# Patient Record
Sex: FEMALE | Race: WHITE | NOT HISPANIC OR LATINO | Employment: FULL TIME | URBAN - METROPOLITAN AREA
[De-identification: names, ages, dates, MRNs, and addresses within clinical notes are randomized per-mention and may not be internally consistent; named-entity substitution may affect disease eponyms.]

---

## 2017-02-27 ENCOUNTER — CONVERSION ENCOUNTER (OUTPATIENT)
Dept: MAMMOGRAPHY | Facility: HOSPITAL | Age: 57
End: 2017-02-27

## 2018-05-03 ENCOUNTER — CONVERSION ENCOUNTER (OUTPATIENT)
Dept: MAMMOGRAPHY | Facility: HOSPITAL | Age: 58
End: 2018-05-03

## 2019-05-26 ENCOUNTER — HOSPITAL ENCOUNTER (OUTPATIENT)
Dept: URGENT CARE | Facility: CLINIC | Age: 59
Discharge: HOME | End: 2019-05-26

## 2019-07-17 ENCOUNTER — HOSPITAL ENCOUNTER (OUTPATIENT)
Dept: OTHER | Facility: HOSPITAL | Age: 59
Discharge: HOME | End: 2019-07-17

## 2019-07-17 LAB
ALBUMIN SERPL-MCNC: 4.2 G/DL (ref 3.5–5)
ALBUMIN/GLOB SERPL: 1.6 {RATIO} (ref 1.4–2.6)
ALP SERPL-CCNC: 94 U/L (ref 53–141)
ALT SERPL-CCNC: 13 U/L (ref 10–40)
ANION GAP SERPL CALC-SCNC: 18 MMOL/L (ref 8–19)
APPEARANCE UR: CLEAR
ASO AB SERPL-ACNC: 46 [IU]/ML (ref 0–200)
AST SERPL-CCNC: 14 U/L (ref 15–50)
BASOPHILS # BLD AUTO: 0.03 10*3/UL (ref 0–0.2)
BASOPHILS NFR BLD AUTO: 0.4 % (ref 0–3)
BILIRUB SERPL-MCNC: 0.24 MG/DL (ref 0.2–1.3)
BILIRUB UR QL: NEGATIVE
BUN SERPL-MCNC: 16 MG/DL (ref 5–25)
BUN/CREAT SERPL: 22 {RATIO} (ref 6–20)
CALCIUM SERPL-MCNC: 8.9 MG/DL (ref 8.7–10.4)
CHLORIDE SERPL-SCNC: 104 MMOL/L (ref 99–111)
COLOR UR: YELLOW
CONV ABS IMM GRAN: 0.02 10*3/UL (ref 0–0.2)
CONV CO2: 25 MMOL/L (ref 22–32)
CONV COLLECTION SOURCE (UA): NORMAL
CONV IMMATURE GRAN: 0.3 % (ref 0–1.8)
CONV TOTAL PROTEIN: 6.8 G/DL (ref 6.3–8.2)
CONV UROBILINOGEN IN URINE BY AUTOMATED TEST STRIP: 1 {EHRLICHU}/DL (ref 0.1–1)
CREAT UR-MCNC: 0.74 MG/DL (ref 0.5–0.9)
DEPRECATED RDW RBC AUTO: 46.7 FL (ref 36.4–46.3)
EOSINOPHIL # BLD AUTO: 0.13 10*3/UL (ref 0–0.7)
EOSINOPHIL # BLD AUTO: 1.9 % (ref 0–7)
ERYTHROCYTE [DISTWIDTH] IN BLOOD BY AUTOMATED COUNT: 14.5 % (ref 11.7–14.4)
GFR SERPLBLD BASED ON 1.73 SQ M-ARVRAT: >60 ML/MIN/{1.73_M2}
GLOBULIN UR ELPH-MCNC: 2.6 G/DL (ref 2–3.5)
GLUCOSE SERPL-MCNC: 93 MG/DL (ref 65–99)
GLUCOSE UR QL: NEGATIVE MG/DL
HBA1C MFR BLD: 13.3 G/DL (ref 12–16)
HCT VFR BLD AUTO: 42.6 % (ref 37–47)
HGB UR QL STRIP: NEGATIVE
KETONES UR QL STRIP: NEGATIVE MG/DL
LEUKOCYTE ESTERASE UR QL STRIP: NEGATIVE
LYMPHOCYTES # BLD AUTO: 1.78 10*3/UL (ref 1–5)
MCH RBC QN AUTO: 27.8 PG (ref 27–31)
MCHC RBC AUTO-ENTMCNC: 31.2 G/DL (ref 33–37)
MCV RBC AUTO: 88.9 FL (ref 81–99)
MONOCYTES # BLD AUTO: 0.6 10*3/UL (ref 0.2–1.2)
MONOCYTES NFR BLD AUTO: 8.7 % (ref 3–10)
NEUTROPHILS # BLD AUTO: 4.34 10*3/UL (ref 2–8)
NEUTROPHILS NFR BLD AUTO: 62.9 % (ref 30–85)
NITRITE UR QL STRIP: NEGATIVE
NRBC CBCN: 0 % (ref 0–0.7)
OSMOLALITY SERPL CALC.SUM OF ELEC: 297 MOSM/KG (ref 273–304)
PH UR STRIP.AUTO: 6.5 [PH] (ref 5–8)
PLATELET # BLD AUTO: 245 10*3/UL (ref 130–400)
PMV BLD AUTO: 10.5 FL (ref 9.4–12.3)
POTASSIUM SERPL-SCNC: 4.4 MMOL/L (ref 3.5–5.3)
PROT UR QL: NEGATIVE MG/DL
RBC # BLD AUTO: 4.79 10*6/UL (ref 4.2–5.4)
SODIUM SERPL-SCNC: 143 MMOL/L (ref 135–147)
SP GR UR: 1.02 (ref 1–1.03)
VARIANT LYMPHS NFR BLD MANUAL: 25.8 % (ref 20–45)
WBC # BLD AUTO: 6.9 10*3/UL (ref 4.8–10.8)

## 2019-07-18 LAB
B BURGDOR IGG+IGM SER-ACNC: <0.91 ISR (ref 0–0.9)
CONV ANTI MICROSOMAL AB: 6 IU/ML (ref 0–34)
CONV HEPATITIS B SURFACE AG W CONFIRMATION RE: NEGATIVE
CONV THYROGLOBULIN ANTIBODY: <1 IU/ML (ref 0–0.9)
HCV AB SER DONR QL: <0.1 S/CO RATIO (ref 0–0.9)
THYROGLOB SERPL-MCNC: 10.9 NG/ML (ref 1.5–38.5)

## 2019-07-20 LAB
CONV EPSTEIN BARR VIRAL CAPSID IGM: <10 U/ML (ref 0–43.9)
CONV EPSTEIN BARR VIRUS ANTIBODY TO VIRAL CAPSID IGG: <10 U/ML (ref 0–21.9)

## 2020-03-15 ENCOUNTER — HOSPITAL ENCOUNTER (OUTPATIENT)
Dept: URGENT CARE | Facility: CLINIC | Age: 60
Discharge: HOME | End: 2020-03-15
Attending: PHYSICIAN ASSISTANT

## 2021-03-26 ENCOUNTER — HOSPITAL ENCOUNTER (OUTPATIENT)
Dept: MAMMOGRAPHY | Facility: HOSPITAL | Age: 61
Discharge: HOME | End: 2021-03-26
Attending: FAMILY MEDICINE

## 2023-09-07 ENCOUNTER — HOSPITAL ENCOUNTER (OUTPATIENT)
Dept: GENERAL RADIOLOGY | Facility: HOSPITAL | Age: 63
Discharge: HOME | End: 2023-09-07
Payer: COMMERCIAL

## 2023-09-07 ENCOUNTER — TRANSCRIBE ORDERS (OUTPATIENT)
Dept: ADMINISTRATIVE | Facility: HOSPITAL | Age: 63
End: 2023-09-07
Payer: COMMERCIAL

## 2023-09-07 DIAGNOSIS — M79.621 PAIN OF RIGHT UPPER ARM: ICD-10-CM

## 2023-09-07 DIAGNOSIS — M79.601 RIGHT UPPER LIMB PAIN: ICD-10-CM

## 2023-09-07 DIAGNOSIS — M79.601 RIGHT UPPER LIMB PAIN: Primary | ICD-10-CM

## 2023-10-26 ENCOUNTER — TRANSCRIBE ORDERS (OUTPATIENT)
Dept: ADMINISTRATIVE | Facility: HOSPITAL | Age: 63
End: 2023-10-26
Payer: COMMERCIAL

## 2023-10-26 DIAGNOSIS — Z12.31 SCREENING MAMMOGRAM FOR BREAST CANCER: Primary | ICD-10-CM

## 2023-11-13 ENCOUNTER — HOSPITAL ENCOUNTER (OUTPATIENT)
Dept: MAMMOGRAPHY | Facility: HOSPITAL | Age: 63
Discharge: HOME | End: 2023-11-13
Admitting: NURSE PRACTITIONER
Payer: COMMERCIAL

## 2023-11-13 DIAGNOSIS — Z12.31 SCREENING MAMMOGRAM FOR BREAST CANCER: ICD-10-CM

## 2023-11-13 PROCEDURE — 77067 SCR MAMMO BI INCL CAD: CPT

## 2023-11-13 PROCEDURE — 77063 BREAST TOMOSYNTHESIS BI: CPT

## 2023-11-18 ENCOUNTER — APPOINTMENT (OUTPATIENT)
Dept: GENERAL RADIOLOGY | Facility: HOSPITAL | Age: 63
End: 2023-11-18
Payer: OTHER MISCELLANEOUS

## 2023-11-18 ENCOUNTER — HOSPITAL ENCOUNTER (EMERGENCY)
Facility: HOSPITAL | Age: 63
Discharge: HOME | End: 2023-11-18
Attending: EMERGENCY MEDICINE
Payer: OTHER MISCELLANEOUS

## 2023-11-18 ENCOUNTER — APPOINTMENT (OUTPATIENT)
Dept: CT IMAGING | Facility: HOSPITAL | Age: 63
End: 2023-11-18
Payer: OTHER MISCELLANEOUS

## 2023-11-18 VITALS
SYSTOLIC BLOOD PRESSURE: 129 MMHG | HEART RATE: 78 BPM | RESPIRATION RATE: 20 BRPM | BODY MASS INDEX: 20.97 KG/M2 | DIASTOLIC BLOOD PRESSURE: 64 MMHG | OXYGEN SATURATION: 95 % | TEMPERATURE: 98.6 F | WEIGHT: 113.98 LBS | HEIGHT: 62 IN

## 2023-11-18 DIAGNOSIS — S16.1XXA STRAIN OF NECK MUSCLE, INITIAL ENCOUNTER: ICD-10-CM

## 2023-11-18 DIAGNOSIS — S00.93XA CONTUSION OF HEAD, UNSPECIFIED PART OF HEAD, INITIAL ENCOUNTER: Primary | ICD-10-CM

## 2023-11-18 DIAGNOSIS — S30.0XXA CONTUSION OF LOWER BACK AND PELVIS, INITIAL ENCOUNTER: ICD-10-CM

## 2023-11-18 PROCEDURE — 72170 X-RAY EXAM OF PELVIS: CPT

## 2023-11-18 PROCEDURE — 25010000002 KETOROLAC TROMETHAMINE PER 15 MG

## 2023-11-18 PROCEDURE — 99284 EMERGENCY DEPT VISIT MOD MDM: CPT

## 2023-11-18 PROCEDURE — 70450 CT HEAD/BRAIN W/O DYE: CPT

## 2023-11-18 PROCEDURE — 72050 X-RAY EXAM NECK SPINE 4/5VWS: CPT

## 2023-11-18 PROCEDURE — 96372 THER/PROPH/DIAG INJ SC/IM: CPT

## 2023-11-18 RX ORDER — DICLOFENAC SODIUM 75 MG/1
75 TABLET, DELAYED RELEASE ORAL 2 TIMES DAILY PRN
Qty: 20 TABLET | Refills: 0 | Status: SHIPPED | OUTPATIENT
Start: 2023-11-18 | End: 2023-11-28

## 2023-11-18 RX ORDER — KETOROLAC TROMETHAMINE 30 MG/ML
30 INJECTION, SOLUTION INTRAMUSCULAR; INTRAVENOUS ONCE
Status: COMPLETED | OUTPATIENT
Start: 2023-11-18 | End: 2023-11-18

## 2023-11-18 RX ADMIN — KETOROLAC TROMETHAMINE 30 MG: 30 INJECTION, SOLUTION INTRAMUSCULAR; INTRAVENOUS at 22:59

## 2024-08-08 ENCOUNTER — OFFICE VISIT (OUTPATIENT)
Dept: ORTHOPEDIC SURGERY | Facility: CLINIC | Age: 64
End: 2024-08-08
Payer: OTHER MISCELLANEOUS

## 2024-08-08 VITALS — SYSTOLIC BLOOD PRESSURE: 109 MMHG | DIASTOLIC BLOOD PRESSURE: 57 MMHG | HEART RATE: 54 BPM | OXYGEN SATURATION: 90 %

## 2024-08-08 DIAGNOSIS — S49.91XA INJURY OF RIGHT SHOULDER, INITIAL ENCOUNTER: Primary | ICD-10-CM

## 2024-08-08 RX ORDER — ATORVASTATIN CALCIUM 40 MG/1
40 TABLET, FILM COATED ORAL NIGHTLY
COMMUNITY
Start: 2023-10-20

## 2024-08-08 RX ORDER — CYCLOBENZAPRINE HCL 5 MG
1 TABLET ORAL 2 TIMES DAILY PRN
COMMUNITY

## 2024-08-08 RX ORDER — MELOXICAM 15 MG/1
TABLET ORAL
COMMUNITY
Start: 2024-07-16

## 2024-08-22 ENCOUNTER — HOSPITAL ENCOUNTER (OUTPATIENT)
Dept: MRI IMAGING | Facility: HOSPITAL | Age: 64
Discharge: HOME | End: 2024-08-22
Admitting: ORTHOPAEDIC SURGERY
Payer: OTHER MISCELLANEOUS

## 2024-08-22 DIAGNOSIS — S49.91XA INJURY OF RIGHT SHOULDER, INITIAL ENCOUNTER: ICD-10-CM

## 2024-08-22 PROCEDURE — 73221 MRI JOINT UPR EXTREM W/O DYE: CPT

## 2024-08-27 ENCOUNTER — PREP FOR SURGERY (OUTPATIENT)
Dept: OTHER | Facility: HOSPITAL | Age: 64
End: 2024-08-27
Payer: COMMERCIAL

## 2024-08-27 ENCOUNTER — OFFICE VISIT (OUTPATIENT)
Dept: ORTHOPEDIC SURGERY | Facility: CLINIC | Age: 64
End: 2024-08-27
Payer: COMMERCIAL

## 2024-08-27 VITALS
BODY MASS INDEX: 20.8 KG/M2 | WEIGHT: 113 LBS | HEART RATE: 61 BPM | OXYGEN SATURATION: 91 % | DIASTOLIC BLOOD PRESSURE: 62 MMHG | HEIGHT: 62 IN | SYSTOLIC BLOOD PRESSURE: 114 MMHG

## 2024-08-27 DIAGNOSIS — S46.011A TRAUMATIC COMPLETE TEAR OF RIGHT ROTATOR CUFF, INITIAL ENCOUNTER: Primary | ICD-10-CM

## 2024-09-05 ENCOUNTER — PATIENT ROUNDING (BHMG ONLY) (OUTPATIENT)
Dept: CARDIOLOGY | Facility: CLINIC | Age: 64
End: 2024-09-05
Payer: COMMERCIAL

## 2024-09-05 ENCOUNTER — OFFICE VISIT (OUTPATIENT)
Dept: CARDIOLOGY | Facility: CLINIC | Age: 64
End: 2024-09-05
Payer: COMMERCIAL

## 2024-09-05 VITALS
WEIGHT: 117 LBS | DIASTOLIC BLOOD PRESSURE: 64 MMHG | SYSTOLIC BLOOD PRESSURE: 133 MMHG | HEART RATE: 59 BPM | HEIGHT: 62 IN | BODY MASS INDEX: 21.53 KG/M2

## 2024-09-05 DIAGNOSIS — R06.02 SOB (SHORTNESS OF BREATH): ICD-10-CM

## 2024-09-05 DIAGNOSIS — R07.2 PRECORDIAL PAIN: Primary | ICD-10-CM

## 2024-09-05 DIAGNOSIS — R00.2 PALPITATIONS: ICD-10-CM

## 2024-09-05 PROCEDURE — 99203 OFFICE O/P NEW LOW 30 MIN: CPT | Performed by: INTERNAL MEDICINE

## 2024-09-05 PROCEDURE — 93000 ELECTROCARDIOGRAM COMPLETE: CPT | Performed by: INTERNAL MEDICINE

## 2024-09-06 ENCOUNTER — HOSPITAL ENCOUNTER (OUTPATIENT)
Dept: CARDIOLOGY | Facility: HOSPITAL | Age: 64
Discharge: HOME | End: 2024-09-06
Payer: COMMERCIAL

## 2024-09-06 PROCEDURE — 93017 CV STRESS TEST TRACING ONLY: CPT

## 2024-09-06 PROCEDURE — 25010000002 REGADENOSON 0.4 MG/5ML SOLUTION: Performed by: INTERNAL MEDICINE

## 2024-09-06 PROCEDURE — 78452 HT MUSCLE IMAGE SPECT MULT: CPT

## 2024-09-06 PROCEDURE — 0 TECHNETIUM SESTAMIBI: Performed by: INTERNAL MEDICINE

## 2024-09-06 PROCEDURE — A9500 TC99M SESTAMIBI: HCPCS | Performed by: INTERNAL MEDICINE

## 2024-09-06 RX ORDER — REGADENOSON 0.08 MG/ML
0.4 INJECTION, SOLUTION INTRAVENOUS
Status: COMPLETED | OUTPATIENT
Start: 2024-09-06 | End: 2024-09-06

## 2024-09-06 RX ADMIN — TECHNETIUM TC 99M SESTAMIBI 1 DOSE: 1 INJECTION INTRAVENOUS at 07:20

## 2024-09-06 RX ADMIN — REGADENOSON 0.4 MG: 0.08 INJECTION, SOLUTION INTRAVENOUS at 08:48

## 2024-09-06 RX ADMIN — TECHNETIUM TC 99M SESTAMIBI 1 DOSE: 1 INJECTION INTRAVENOUS at 08:48

## 2024-09-09 LAB
BH CV REST NUCLEAR ISOTOPE DOSE: 10.9 MCI
BH CV STRESS COMMENTS STAGE 1: NORMAL
BH CV STRESS DOSE REGADENOSON STAGE 1: 0.4
BH CV STRESS DURATION MIN STAGE 1: 0
BH CV STRESS DURATION SEC STAGE 1: 10
BH CV STRESS NUCLEAR ISOTOPE DOSE: 32.9 MCI
BH CV STRESS PROTOCOL 1: NORMAL
BH CV STRESS RECOVERY BP: NORMAL MMHG
BH CV STRESS RECOVERY HR: 77 BPM
BH CV STRESS STAGE 1: 1
LV EF NUC BP: 50 %
MAXIMAL PREDICTED HEART RATE: 155 BPM
PERCENT MAX PREDICTED HR: 65.16 %
STRESS BASELINE BP: NORMAL MMHG
STRESS BASELINE HR: 53 BPM
STRESS PERCENT HR: 77 %
STRESS POST PEAK BP: NORMAL MMHG
STRESS POST PEAK HR: 101 BPM
STRESS TARGET HR: 132 BPM

## 2024-09-11 ENCOUNTER — HOSPITAL ENCOUNTER (OUTPATIENT)
Dept: CARDIOLOGY | Facility: HOSPITAL | Age: 64
Discharge: HOME | End: 2024-09-11
Admitting: INTERNAL MEDICINE
Payer: COMMERCIAL

## 2024-09-11 VITALS
BODY MASS INDEX: 21.54 KG/M2 | SYSTOLIC BLOOD PRESSURE: 132 MMHG | OXYGEN SATURATION: 94 % | HEIGHT: 62 IN | DIASTOLIC BLOOD PRESSURE: 68 MMHG | WEIGHT: 117.06 LBS | HEART RATE: 53 BPM

## 2024-09-11 LAB
AORTIC DIMENSIONLESS INDEX: 0.6 (DI)
ASCENDING AORTA: 2.9 CM
BH CV ECHO MEAS - ACS: 1.71 CM
BH CV ECHO MEAS - AO MAX PG: 6.7 MMHG
BH CV ECHO MEAS - AO MEAN PG: 2.8 MMHG
BH CV ECHO MEAS - AO V2 MAX: 129 CM/SEC
BH CV ECHO MEAS - AO V2 VTI: 27.5 CM
BH CV ECHO MEAS - AVA(I,D): 1.96 CM2
BH CV ECHO MEAS - EDV(CUBED): 104 ML
BH CV ECHO MEAS - EDV(MOD-SP2): 50 ML
BH CV ECHO MEAS - EDV(MOD-SP4): 45 ML
BH CV ECHO MEAS - EF(MOD-BP): 61.2 %
BH CV ECHO MEAS - EF(MOD-SP2): 64 %
BH CV ECHO MEAS - EF(MOD-SP4): 62.2 %
BH CV ECHO MEAS - ESV(CUBED): 39.5 ML
BH CV ECHO MEAS - ESV(MOD-SP2): 18 ML
BH CV ECHO MEAS - ESV(MOD-SP4): 17 ML
BH CV ECHO MEAS - FS: 27.6 %
BH CV ECHO MEAS - IVS/LVPW: 0.87 CM
BH CV ECHO MEAS - IVSD: 0.63 CM
BH CV ECHO MEAS - LA A2CS (ATRIAL LENGTH): 4.8 CM
BH CV ECHO MEAS - LA A4C LENGTH: 4.2 CM
BH CV ECHO MEAS - LAT PEAK E' VEL: 9.5 CM/SEC
BH CV ECHO MEAS - LV DIASTOLIC VOL/BSA (35-75): 29.6 CM2
BH CV ECHO MEAS - LV MASS(C)D: 99.5 GRAMS
BH CV ECHO MEAS - LV MAX PG: 3 MMHG
BH CV ECHO MEAS - LV MEAN PG: 1.36 MMHG
BH CV ECHO MEAS - LV SYSTOLIC VOL/BSA (12-30): 11.2 CM2
BH CV ECHO MEAS - LV V1 MAX: 86.1 CM/SEC
BH CV ECHO MEAS - LV V1 VTI: 17.9 CM
BH CV ECHO MEAS - LVIDD: 4.7 CM
BH CV ECHO MEAS - LVIDS: 3.4 CM
BH CV ECHO MEAS - LVOT AREA: 3 CM2
BH CV ECHO MEAS - LVOT DIAM: 1.96 CM
BH CV ECHO MEAS - LVPWD: 0.73 CM
BH CV ECHO MEAS - MED PEAK E' VEL: 8.9 CM/SEC
BH CV ECHO MEAS - MV A DUR: 0.12 SEC
BH CV ECHO MEAS - MV A MAX VEL: 51.4 CM/SEC
BH CV ECHO MEAS - MV DEC SLOPE: 498 CM/SEC2
BH CV ECHO MEAS - MV DEC TIME: 220 SEC
BH CV ECHO MEAS - MV E MAX VEL: 45.4 CM/SEC
BH CV ECHO MEAS - MV E/A: 0.88
BH CV ECHO MEAS - MV MAX PG: 2.47 MMHG
BH CV ECHO MEAS - MV MEAN PG: 0.69 MMHG
BH CV ECHO MEAS - MV P1/2T: 48.5 MSEC
BH CV ECHO MEAS - MV V2 VTI: 24.6 CM
BH CV ECHO MEAS - MVA(P1/2T): 4.5 CM2
BH CV ECHO MEAS - MVA(VTI): 2.19 CM2
BH CV ECHO MEAS - PA ACC TIME: 0.13 SEC
BH CV ECHO MEAS - PA V2 MAX: 58.2 CM/SEC
BH CV ECHO MEAS - PULM A REVS DUR: 0.1 SEC
BH CV ECHO MEAS - PULM A REVS VEL: 24.4 CM/SEC
BH CV ECHO MEAS - PULM DIAS VEL: 28.3 CM/SEC
BH CV ECHO MEAS - PULM S/D: 1.44
BH CV ECHO MEAS - PULM SYS VEL: 40.7 CM/SEC
BH CV ECHO MEAS - QP/QS: 0.34
BH CV ECHO MEAS - RAP SYSTOLE: 3 MMHG
BH CV ECHO MEAS - RV MAX PG: 0.49 MMHG
BH CV ECHO MEAS - RV V1 MAX: 35.1 CM/SEC
BH CV ECHO MEAS - RV V1 VTI: 6 CM
BH CV ECHO MEAS - RVOT DIAM: 1.99 CM
BH CV ECHO MEAS - RVSP: 16 MMHG
BH CV ECHO MEAS - SV(LVOT): 53.8 ML
BH CV ECHO MEAS - SV(MOD-SP2): 32 ML
BH CV ECHO MEAS - SV(MOD-SP4): 28 ML
BH CV ECHO MEAS - SV(RVOT): 18.5 ML
BH CV ECHO MEAS - SVI(LVOT): 35.4 ML/M2
BH CV ECHO MEAS - SVI(MOD-SP2): 21 ML/M2
BH CV ECHO MEAS - SVI(MOD-SP4): 18.4 ML/M2
BH CV ECHO MEAS - TAPSE (>1.6): 1.47 CM
BH CV ECHO MEAS - TR MAX PG: 13.2 MMHG
BH CV ECHO MEAS - TR MAX VEL: 181.3 CM/SEC
BH CV ECHO MEASUREMENTS AVERAGE E/E' RATIO: 4.93
BH CV XLRA - RV BASE: 2.33 CM
BH CV XLRA - RV LENGTH: 5.5 CM
BH CV XLRA - RV MID: 2.19 CM
BH CV XLRA - TDI S': 13.2 CM/SEC
LEFT ATRIUM VOLUME INDEX: 30.4 ML/M2
SINUS: 2.9 CM
STJ: 2.25 CM

## 2024-09-11 PROCEDURE — 93306 TTE W/DOPPLER COMPLETE: CPT | Performed by: INTERNAL MEDICINE

## 2024-09-11 PROCEDURE — 93306 TTE W/DOPPLER COMPLETE: CPT

## 2024-09-19 ENCOUNTER — OFFICE VISIT (OUTPATIENT)
Dept: CARDIOLOGY | Facility: CLINIC | Age: 64
End: 2024-09-19
Payer: COMMERCIAL

## 2024-09-19 VITALS
BODY MASS INDEX: 22.45 KG/M2 | SYSTOLIC BLOOD PRESSURE: 122 MMHG | DIASTOLIC BLOOD PRESSURE: 72 MMHG | WEIGHT: 122 LBS | HEART RATE: 59 BPM | HEIGHT: 62 IN

## 2024-09-19 DIAGNOSIS — R00.2 PALPITATIONS: ICD-10-CM

## 2024-09-19 DIAGNOSIS — R94.39 ABNORMAL NUCLEAR STRESS TEST: ICD-10-CM

## 2024-09-19 DIAGNOSIS — R06.02 SOB (SHORTNESS OF BREATH): ICD-10-CM

## 2024-09-19 DIAGNOSIS — R07.2 PRECORDIAL PAIN: Primary | ICD-10-CM

## 2024-09-19 PROCEDURE — 99214 OFFICE O/P EST MOD 30 MIN: CPT | Performed by: INTERNAL MEDICINE

## 2024-09-20 ENCOUNTER — TELEPHONE (OUTPATIENT)
Dept: ORTHOPEDIC SURGERY | Facility: CLINIC | Age: 64
End: 2024-09-20
Payer: COMMERCIAL

## 2024-09-20 PROBLEM — R07.2 PRECORDIAL PAIN: Status: ACTIVE | Noted: 2024-09-19

## 2024-09-20 PROBLEM — R94.39 ABNORMAL NUCLEAR STRESS TEST: Status: ACTIVE | Noted: 2024-09-19

## 2024-09-20 NOTE — PAT
"MESSAGE SENT TO [facility] THAT HAD SPOKE WITH DAUGHTER PAT AND THAT SHE REPORTED HER MOTHER HAD SEEN CARDIOLOGY ON 9/19/24 AND THAT HE WOULD NOT GIVE HER CARDIAC CLEARANCE BECAUSE OF \"BLOCKAGES SHE NEEDS TO GET TAKEN CARE OF FIRST\". INSTRUCTED DAUGHTER THEY WOULD ALSO NEED TO CALL [facility] AND LET THEM KNOW.   " none

## 2024-09-23 ENCOUNTER — LAB (OUTPATIENT)
Dept: LAB | Facility: HOSPITAL | Age: 64
End: 2024-09-23
Payer: COMMERCIAL

## 2024-09-23 DIAGNOSIS — S46.011A TRAUMATIC COMPLETE TEAR OF RIGHT ROTATOR CUFF, INITIAL ENCOUNTER: ICD-10-CM

## 2024-09-23 DIAGNOSIS — R07.2 PRECORDIAL PAIN: ICD-10-CM

## 2024-09-23 DIAGNOSIS — R94.39 ABNORMAL NUCLEAR STRESS TEST: ICD-10-CM

## 2024-09-23 LAB
ANION GAP SERPL CALCULATED.3IONS-SCNC: 8.7 MMOL/L (ref 5–15)
APTT PPP: 26.7 SECONDS (ref 24.2–34.2)
BACTERIA UR QL AUTO: ABNORMAL /HPF
BASOPHILS # BLD AUTO: 0.03 10*3/MM3 (ref 0–0.2)
BASOPHILS NFR BLD AUTO: 0.5 % (ref 0–1.5)
BILIRUB UR QL STRIP: NEGATIVE
BUN SERPL-MCNC: 20 MG/DL (ref 8–23)
BUN/CREAT SERPL: 21.1 (ref 7–25)
CALCIUM SPEC-SCNC: 9.3 MG/DL (ref 8.6–10.5)
CHLORIDE SERPL-SCNC: 108 MMOL/L (ref 98–107)
CLARITY UR: CLEAR
CO2 SERPL-SCNC: 28.3 MMOL/L (ref 22–29)
COLOR UR: YELLOW
CREAT SERPL-MCNC: 0.95 MG/DL (ref 0.57–1)
DEPRECATED RDW RBC AUTO: 47.8 FL (ref 37–54)
EGFRCR SERPLBLD CKD-EPI 2021: 66.6 ML/MIN/1.73
EOSINOPHIL # BLD AUTO: 0.16 10*3/MM3 (ref 0–0.4)
EOSINOPHIL NFR BLD AUTO: 2.6 % (ref 0.3–6.2)
ERYTHROCYTE [DISTWIDTH] IN BLOOD BY AUTOMATED COUNT: 14.1 % (ref 12.3–15.4)
GLUCOSE SERPL-MCNC: 86 MG/DL (ref 65–99)
GLUCOSE UR STRIP-MCNC: NEGATIVE MG/DL
HCT VFR BLD AUTO: 38.2 % (ref 34–46.6)
HGB BLD-MCNC: 12.1 G/DL (ref 12–15.9)
HGB UR QL STRIP.AUTO: NEGATIVE
HOLD SPECIMEN: NORMAL
HYALINE CASTS UR QL AUTO: ABNORMAL /LPF
IMM GRANULOCYTES # BLD AUTO: 0.02 10*3/MM3 (ref 0–0.05)
IMM GRANULOCYTES NFR BLD AUTO: 0.3 % (ref 0–0.5)
INR PPP: 0.99 (ref 0.86–1.15)
KETONES UR QL STRIP: NEGATIVE
LEUKOCYTE ESTERASE UR QL STRIP.AUTO: ABNORMAL
LYMPHOCYTES # BLD AUTO: 1.29 10*3/MM3 (ref 0.7–3.1)
LYMPHOCYTES NFR BLD AUTO: 20.6 % (ref 19.6–45.3)
MCH RBC QN AUTO: 28.9 PG (ref 26.6–33)
MCHC RBC AUTO-ENTMCNC: 31.7 G/DL (ref 31.5–35.7)
MCV RBC AUTO: 91.4 FL (ref 79–97)
MONOCYTES # BLD AUTO: 0.58 10*3/MM3 (ref 0.1–0.9)
MONOCYTES NFR BLD AUTO: 9.3 % (ref 5–12)
NEUTROPHILS NFR BLD AUTO: 4.18 10*3/MM3 (ref 1.7–7)
NEUTROPHILS NFR BLD AUTO: 66.7 % (ref 42.7–76)
NITRITE UR QL STRIP: NEGATIVE
NRBC BLD AUTO-RTO: 0 /100 WBC (ref 0–0.2)
PH UR STRIP.AUTO: 6 [PH] (ref 5–8)
PLATELET # BLD AUTO: 236 10*3/MM3 (ref 140–450)
PMV BLD AUTO: 11.5 FL (ref 6–12)
POTASSIUM SERPL-SCNC: 4.3 MMOL/L (ref 3.5–5.2)
PROT UR QL STRIP: ABNORMAL
PROTHROMBIN TIME: 13.3 SECONDS (ref 11.8–14.9)
RBC # BLD AUTO: 4.18 10*6/MM3 (ref 3.77–5.28)
RBC # UR STRIP: ABNORMAL /HPF
REF LAB TEST METHOD: ABNORMAL
SODIUM SERPL-SCNC: 145 MMOL/L (ref 136–145)
SP GR UR STRIP: 1.02 (ref 1–1.03)
SQUAMOUS #/AREA URNS HPF: ABNORMAL /HPF
UROBILINOGEN UR QL STRIP: ABNORMAL
WBC # UR STRIP: ABNORMAL /HPF
WBC NRBC COR # BLD AUTO: 6.26 10*3/MM3 (ref 3.4–10.8)

## 2024-09-23 PROCEDURE — 81001 URINALYSIS AUTO W/SCOPE: CPT

## 2024-09-23 PROCEDURE — 80048 BASIC METABOLIC PNL TOTAL CA: CPT

## 2024-09-23 PROCEDURE — 85730 THROMBOPLASTIN TIME PARTIAL: CPT

## 2024-09-23 PROCEDURE — 85025 COMPLETE CBC W/AUTO DIFF WBC: CPT

## 2024-09-23 PROCEDURE — 85610 PROTHROMBIN TIME: CPT

## 2024-09-27 ENCOUNTER — HOSPITAL ENCOUNTER (OUTPATIENT)
Facility: HOSPITAL | Age: 64
Setting detail: HOSPITAL OUTPATIENT SURGERY
Discharge: HOME | End: 2024-09-27
Attending: INTERNAL MEDICINE | Admitting: INTERNAL MEDICINE
Payer: COMMERCIAL

## 2024-09-27 ENCOUNTER — TELEPHONE (OUTPATIENT)
Dept: ORTHOPEDIC SURGERY | Facility: CLINIC | Age: 64
End: 2024-09-27
Payer: COMMERCIAL

## 2024-09-27 VITALS
BODY MASS INDEX: 22.63 KG/M2 | RESPIRATION RATE: 20 BRPM | SYSTOLIC BLOOD PRESSURE: 138 MMHG | HEART RATE: 58 BPM | WEIGHT: 123 LBS | TEMPERATURE: 97 F | HEIGHT: 62 IN | OXYGEN SATURATION: 94 % | DIASTOLIC BLOOD PRESSURE: 70 MMHG

## 2024-09-27 DIAGNOSIS — R07.2 PRECORDIAL PAIN: ICD-10-CM

## 2024-09-27 DIAGNOSIS — R94.39 ABNORMAL NUCLEAR STRESS TEST: ICD-10-CM

## 2024-09-27 PROBLEM — R00.2 PALPITATIONS: Status: ACTIVE | Noted: 2024-09-27

## 2024-09-27 PROBLEM — R06.02 SOB (SHORTNESS OF BREATH): Status: ACTIVE | Noted: 2024-09-27

## 2024-09-27 PROCEDURE — 93458 L HRT ARTERY/VENTRICLE ANGIO: CPT | Performed by: INTERNAL MEDICINE

## 2024-09-27 PROCEDURE — 25010000002 FENTANYL CITRATE (PF) 50 MCG/ML SOLUTION: Performed by: INTERNAL MEDICINE

## 2024-09-27 PROCEDURE — 25010000002 ONDANSETRON PER 1 MG: Performed by: INTERNAL MEDICINE

## 2024-09-27 PROCEDURE — 25010000002 HEPARIN (PORCINE) PER 1000 UNITS: Performed by: INTERNAL MEDICINE

## 2024-09-27 PROCEDURE — C1769 GUIDE WIRE: HCPCS | Performed by: INTERNAL MEDICINE

## 2024-09-27 PROCEDURE — 25510000001 IOPAMIDOL PER 1 ML: Performed by: INTERNAL MEDICINE

## 2024-09-27 PROCEDURE — 25010000002 MIDAZOLAM PER 1 MG: Performed by: INTERNAL MEDICINE

## 2024-09-27 PROCEDURE — 25810000003 SODIUM CHLORIDE 0.9 % SOLUTION: Performed by: INTERNAL MEDICINE

## 2024-09-27 PROCEDURE — C1894 INTRO/SHEATH, NON-LASER: HCPCS | Performed by: INTERNAL MEDICINE

## 2024-09-27 RX ORDER — ACETAMINOPHEN 325 MG/1
650 TABLET ORAL EVERY 4 HOURS PRN
Status: DISCONTINUED | OUTPATIENT
Start: 2024-09-27 | End: 2024-09-27 | Stop reason: HOSPADM

## 2024-09-27 RX ORDER — IOPAMIDOL 755 MG/ML
INJECTION, SOLUTION INTRAVASCULAR
Status: DISCONTINUED | OUTPATIENT
Start: 2024-09-27 | End: 2024-09-27 | Stop reason: HOSPADM

## 2024-09-27 RX ORDER — HEPARIN SODIUM 1000 [USP'U]/ML
INJECTION, SOLUTION INTRAVENOUS; SUBCUTANEOUS
Status: DISCONTINUED | OUTPATIENT
Start: 2024-09-27 | End: 2024-09-27 | Stop reason: HOSPADM

## 2024-09-27 RX ORDER — SODIUM CHLORIDE 0.9 % (FLUSH) 0.9 %
10 SYRINGE (ML) INJECTION AS NEEDED
Status: DISCONTINUED | OUTPATIENT
Start: 2024-09-27 | End: 2024-09-27 | Stop reason: HOSPADM

## 2024-09-27 RX ORDER — HYDROCODONE BITARTRATE AND ACETAMINOPHEN 5; 325 MG/1; MG/1
1 TABLET ORAL EVERY 6 HOURS PRN
Status: DISCONTINUED | OUTPATIENT
Start: 2024-09-27 | End: 2024-09-27 | Stop reason: HOSPADM

## 2024-09-27 RX ORDER — SODIUM CHLORIDE 9 MG/ML
40 INJECTION, SOLUTION INTRAVENOUS AS NEEDED
Status: DISCONTINUED | OUTPATIENT
Start: 2024-09-27 | End: 2024-09-27 | Stop reason: HOSPADM

## 2024-09-27 RX ORDER — SODIUM CHLORIDE 0.9 % (FLUSH) 0.9 %
10 SYRINGE (ML) INJECTION EVERY 12 HOURS SCHEDULED
Status: DISCONTINUED | OUTPATIENT
Start: 2024-09-27 | End: 2024-09-27 | Stop reason: HOSPADM

## 2024-09-27 RX ORDER — NITROGLYCERIN 0.4 MG/1
0.4 TABLET SUBLINGUAL
Status: DISCONTINUED | OUTPATIENT
Start: 2024-09-27 | End: 2024-09-27 | Stop reason: HOSPADM

## 2024-09-27 RX ORDER — MIDAZOLAM HYDROCHLORIDE 1 MG/ML
INJECTION INTRAMUSCULAR; INTRAVENOUS
Status: DISCONTINUED | OUTPATIENT
Start: 2024-09-27 | End: 2024-09-27 | Stop reason: HOSPADM

## 2024-09-27 RX ORDER — SODIUM CHLORIDE 9 MG/ML
75 INJECTION, SOLUTION INTRAVENOUS CONTINUOUS
Status: DISCONTINUED | OUTPATIENT
Start: 2024-09-27 | End: 2024-09-27 | Stop reason: HOSPADM

## 2024-09-27 RX ORDER — VERAPAMIL HYDROCHLORIDE 2.5 MG/ML
INJECTION, SOLUTION INTRAVENOUS
Status: DISCONTINUED | OUTPATIENT
Start: 2024-09-27 | End: 2024-09-27 | Stop reason: HOSPADM

## 2024-09-27 RX ORDER — FENTANYL CITRATE 50 UG/ML
INJECTION, SOLUTION INTRAMUSCULAR; INTRAVENOUS
Status: DISCONTINUED | OUTPATIENT
Start: 2024-09-27 | End: 2024-09-27 | Stop reason: HOSPADM

## 2024-09-27 RX ORDER — LIDOCAINE HYDROCHLORIDE 20 MG/ML
INJECTION, SOLUTION INFILTRATION; PERINEURAL
Status: DISCONTINUED | OUTPATIENT
Start: 2024-09-27 | End: 2024-09-27 | Stop reason: HOSPADM

## 2024-09-27 RX ORDER — ONDANSETRON 2 MG/ML
4 INJECTION INTRAMUSCULAR; INTRAVENOUS EVERY 6 HOURS PRN
Status: DISCONTINUED | OUTPATIENT
Start: 2024-09-27 | End: 2024-09-27 | Stop reason: HOSPADM

## 2024-09-27 RX ADMIN — ONDANSETRON 4 MG: 2 INJECTION, SOLUTION INTRAMUSCULAR; INTRAVENOUS at 11:12

## 2024-09-27 RX ADMIN — SODIUM CHLORIDE 75 ML/HR: 9 INJECTION, SOLUTION INTRAVENOUS at 09:06

## 2024-10-02 ENCOUNTER — TELEPHONE (OUTPATIENT)
Dept: CARDIOLOGY | Facility: CLINIC | Age: 64
End: 2024-10-02

## 2024-10-02 NOTE — TELEPHONE ENCOUNTER
Caller: PAT SIMMONS    Relationship: Emergency Contact    Best call back number: [phone]    What is the best time to reach you: ANYTIME    Who are you requesting to speak with (clinical staff, provider,  specific staff member): CLINICAL        What was the call regarding: DID YOU RECEIVED CARDIAC CLEARANCE FROM   AT ORTHOPEDIC OFFICE?  PLEASE CONTACT PAT IF YOU HAVE NOT RECEIVED.

## 2024-10-11 ENCOUNTER — OFFICE VISIT (OUTPATIENT)
Dept: CARDIOLOGY | Facility: CLINIC | Age: 64
End: 2024-10-11
Payer: COMMERCIAL

## 2024-10-11 VITALS
SYSTOLIC BLOOD PRESSURE: 153 MMHG | BODY MASS INDEX: 22.26 KG/M2 | WEIGHT: 121 LBS | HEART RATE: 57 BPM | DIASTOLIC BLOOD PRESSURE: 80 MMHG | HEIGHT: 62 IN

## 2024-10-11 DIAGNOSIS — I10 PRIMARY HYPERTENSION: ICD-10-CM

## 2024-10-11 DIAGNOSIS — R00.2 PALPITATIONS: ICD-10-CM

## 2024-10-11 DIAGNOSIS — Z09 HOSPITAL DISCHARGE FOLLOW-UP: Primary | ICD-10-CM

## 2024-10-11 RX ORDER — UMECLIDINIUM BROMIDE AND VILANTEROL TRIFENATATE 62.5; 25 UG/1; UG/1
POWDER RESPIRATORY (INHALATION)
COMMUNITY
Start: 2024-10-08

## 2024-10-11 RX ORDER — HYDROCHLOROTHIAZIDE 25 MG/1
12.5 TABLET ORAL DAILY PRN
COMMUNITY
Start: 2024-09-30

## 2024-10-11 RX ORDER — ALBUTEROL SULFATE 90 UG/1
INHALANT RESPIRATORY (INHALATION)
COMMUNITY
Start: 2024-10-08

## 2024-10-11 RX ORDER — GABAPENTIN 800 MG/1
TABLET ORAL
COMMUNITY
Start: 2024-10-04

## 2024-10-11 RX ORDER — PROPRANOLOL HCL 20 MG
1 TABLET ORAL DAILY
COMMUNITY
Start: 2024-09-10

## 2024-10-11 RX ORDER — HYDROCODONE BITARTRATE AND ACETAMINOPHEN 7.5; 325 MG/1; MG/1
1 TABLET ORAL EVERY 6 HOURS PRN
COMMUNITY
Start: 2024-10-05

## 2024-10-31 ENCOUNTER — TELEPHONE (OUTPATIENT)
Dept: ORTHOPEDIC SURGERY | Facility: CLINIC | Age: 64
End: 2024-10-31
Payer: COMMERCIAL

## 2024-10-31 NOTE — TELEPHONE ENCOUNTER
Provider: DR. DURAND    Caller: DENICE ESPOSITO    Relationship to Patient: SELF    Phone Number: [phone]    Reason for Call: PATIENT CALLED STATING SHE NEEDS A LETTER STATING SURGERY HAS BEEN MOVED FROM 11/18/24 TO 12/02/24. EMPLOYER IS REQUESTING THIS WITHIN THE NEXT COUPLE DAYS. WOULD LIKE TO GET THIS PICKED UP AS SOON AS POSSIBLE. PLEASE ADVISE.

## 2024-10-31 NOTE — LETTER
November 1, 2024     Shannon PEREZ Caswell  [address]  [place] [address]    Patient: Shannon Esposito   YOB: 1960   Date of Visit: 10/31/2024     To whom it may concern:    I am writing on behalf of my patient Shannon Esposito.  The patient's surgery has been rescheduled from November 18, 2024 to December 2, 2024.  If any questions or concerns please contact our office.       Sincerely,        Buddy Crook MD          Progress Notes:  Provider: DR. CROOK    Caller: SHANNON ESPOSITO    Relationship to Patient: SELF    Phone Number: [phone]    Reason for Call: PATIENT CALLED STATING SHE NEEDS A LETTER STATING SURGERY HAS BEEN MOVED FROM 11/18/24 TO 12/02/24. EMPLOYER IS REQUESTING THIS WITHIN THE NEXT COUPLE DAYS. WOULD LIKE TO GET THIS PICKED UP AS SOON AS POSSIBLE. PLEASE ADVISE.

## 2024-11-07 ENCOUNTER — OFFICE VISIT (OUTPATIENT)
Dept: ORTHOPEDIC SURGERY | Facility: CLINIC | Age: 64
End: 2024-11-07
Payer: OTHER MISCELLANEOUS

## 2024-11-07 VITALS
HEIGHT: 62 IN | WEIGHT: 121 LBS | HEART RATE: 53 BPM | DIASTOLIC BLOOD PRESSURE: 74 MMHG | BODY MASS INDEX: 22.26 KG/M2 | SYSTOLIC BLOOD PRESSURE: 144 MMHG | OXYGEN SATURATION: 93 %

## 2024-11-07 DIAGNOSIS — S49.91XA INJURY OF RIGHT SHOULDER, INITIAL ENCOUNTER: ICD-10-CM

## 2024-11-07 DIAGNOSIS — S46.011A TRAUMATIC COMPLETE TEAR OF RIGHT ROTATOR CUFF, INITIAL ENCOUNTER: Primary | ICD-10-CM

## 2024-11-07 NOTE — H&P (VIEW-ONLY)
"Chief Complaint  Follow-up of the Right Shoulder       Subjective      Shannon Mondragon presents to [facility] for a follow up for her right shoulder. Her right shoulder has been bothering her for several weeks and is currently scheduled for surgery at the end of December. To review, she states she jammed her shoulder while she was at work. She reports she has limited range of motion and has pain with reaching, pulling and tugging. She has tried an oral steroid without much relief. This is a work comp injury.     No Known Allergies     Social History     Socioeconomic History    Marital status:    Tobacco Use    Smoking status: Never     Passive exposure: Never    Smokeless tobacco: Never   Vaping Use    Vaping status: Never Used   Substance and Sexual Activity    Alcohol use: Not Currently    Drug use: Never    Sexual activity: Defer        I reviewed the patient's chief complaint, history of present illness, review of systems, past medical history, surgical history, family history, social history, medications, and allergy list.     Review of Systems     Constitutional: Denies fevers, chills, weight loss  Cardiovascular: Denies chest pain, shortness of breath  Skin: Denies rashes, acute skin changes  Neurologic: Denies headache, loss of consciousness  MSK: Right shoulder pain       Vital Signs:   /74   Pulse 53   Ht 157.5 cm (62\")   Wt 54.9 kg (121 lb)   SpO2 93%   BMI 22.13 kg/m²            Ortho Exam    Physical Exam  General:Alert. No acute distress     Right upper extremity: forward elevation  to 150 degrees, abduction to 100 degrees, external rotation  to 50 degrees, internal rotation to 30 degrees, 4 minus supraspinatus strength, 4 plus infraspinatus and subscap, mild atrophy to posterior  shoulder, internal rotation to SI joint, positive  impingement, neurovascularly intact, sensation intact to the medial , radial and ulnar nerve     Procedures    Imaging " Results (Most Recent)       None             Result Review :       No results found.           Assessment and Plan     Diagnoses and all orders for this visit:    1. Traumatic complete tear of right rotator cuff, initial encounter (Primary)    2. Injury of right shoulder, initial encounter        The patient presents here today for follow up for her right shoulder.     Discussed operative treatment options regarding a right shoulder arthroscopy with rotator cuff repair with possible biceps tendonesis versus tendotomy, subacromial decompression versus non operative treatment options regarding injections, medications and physical therapy.      Patient wishes to proceed with operative treatment options. Risks and benefits were reviewed and discussed with the patient today. Patient expressed understanding and wishes to proceed.      Work note provided today.     Sling given to the patient today but advised not to wear this all the time to avoid getting stiff.     Discussed surgery., Risks/benefits discussed with patient including, but not limited to: infection, bleeding, neurovascular damage, re-rupture, aesthetic deformity, need for further surgery, and death., Surgery pamphlet given., Call or return if worsening symptoms., and I am ordering the use of the Nice1 cold therapy machine for 60 days post-op as part of an opioid-sparing approach to help manage pain and edema.  I feel this is medically necessary for the best care for this patient.       Follow Up     2 weeks post-operatively      Patient was given instructions and counseling regarding her condition or for health maintenance advice. Please see specific information pulled into the AVS if appropriate.     Scribed for Buddy Crook MD by Domi Gramajo.  11/07/24   13:55 EST    I have personally performed the services described in this document as scribed by the above individual and it is both accurate and complete. Buddy Crook MD 11/07/24

## 2024-11-25 RX ORDER — PROPRANOLOL HCL 20 MG
20 TABLET ORAL DAILY
COMMUNITY

## 2024-11-25 NOTE — PRE-PROCEDURE INSTRUCTIONS
IMPORTANT INSTRUCTIONS - PRE-ADMISSION TESTING  DO NOT EAT/DRINK OR CHEW anything after midnight the night before your procedure.      Take the following medications the morning of your procedure with JUST A SIP OF WATER:  PROPANOLOL, GABAPENTIN, HYDROCODONE IF NEEDED _______________________________________________________________________________________________________________________________________________________________________________________    DO NOT BRING your medications to the hospital with you, UNLESS something has changed since your PRE-Admission Testing appointment.  Hold all vitamins, supplements, and NSAIDS (Non- steroidal anti-inflammatory meds) for one week prior to surgery (you MAY take Tylenol or Acetaminophen).  If you are diabetic, check your blood sugar the morning of your procedure. If it is less than 70 or if you are feeling symptomatic, call the following number for further instructions: [phone]_.  Use your inhalers/nebulizers as usual, the morning of your procedure. BRING YOUR INHALERS with you.   Bring your CPAP or BIPAP to hospital, ONLY IF YOU WILL BE SPENDING THE NIGHT.   Make sure you have a ride home and have someone who will stay with you the day of your procedure after you go home.  If you have any questions, please call your Pre-Admission Testing Nurse, _SALMA____ at [phone]_____.   Per anesthesia request, do not smoke for 24 hours before your procedure or as instructed by your surgeon.    PREOPERATIVE (BEFORE SURGERY)              BATHING INSTRUCTIONS  Instructions:    You will need to shower 1 time utilizing the soap provided; at the times indicated   below:     MORNING OF SURGERY 12/2/24     Wash your hair and face with normal shampoo and soap, rinse it well before using the surgical soap.      In the shower, wet the skin completely with water from your neck to your feet. Apply the cleanser to your   body ONLY FROM THE NECK TO YOUR FEET.     Do NOT USE THE  CLEANSER ON YOUR FACE, HEAD, OR GENITAL (PRIVATE) AREAS.   Keep it out of your eyes, ears, and mouth because of the risk of injury to those areas.      Scrub with a clean washcloth for each bath utilizing the soap provided from the top of your body to the   bottom starting at the neck area.      Pay close attention to your armpits, groin area, and the site of surgery.      Wash your body gently for 5 minutes. Stand outside the stream or turn off the water while scrubbing your   body. Do NOT wash with your regular soap after the surgical cleanser is used.      RINSE THE CLEANSER OFF COMPLETELY with plenty of water. Rinse the area again thoroughly.      Dry off with a clean towel. The surgical soap can cause dryness; however do NOT APPLY LOTION,   CREAM, POWDER, and/or DEODORANT AFTER SHOWERING.     Be sure to where clean clothes after showering.      Ensure CLEAN BED LINENS AFTER FIRST wash with the surgical soap.      NO PETS ALLOWED IN THE BED with you after utilizing the surgical soap.

## 2024-11-26 ENCOUNTER — ANESTHESIA EVENT (OUTPATIENT)
Dept: PERIOP | Facility: HOSPITAL | Age: 64
End: 2024-11-26
Payer: OTHER MISCELLANEOUS

## 2024-12-02 ENCOUNTER — ANESTHESIA (OUTPATIENT)
Dept: PERIOP | Facility: HOSPITAL | Age: 64
End: 2024-12-02
Payer: OTHER MISCELLANEOUS

## 2024-12-02 ENCOUNTER — ANESTHESIA EVENT CONVERTED (OUTPATIENT)
Dept: ANESTHESIOLOGY | Facility: HOSPITAL | Age: 64
End: 2024-12-02
Payer: OTHER MISCELLANEOUS

## 2024-12-02 ENCOUNTER — HOSPITAL ENCOUNTER (OUTPATIENT)
Facility: HOSPITAL | Age: 64
Setting detail: HOSPITAL OUTPATIENT SURGERY
Discharge: HOME | End: 2024-12-02
Attending: ORTHOPAEDIC SURGERY | Admitting: ORTHOPAEDIC SURGERY
Payer: OTHER MISCELLANEOUS

## 2024-12-02 VITALS
RESPIRATION RATE: 18 BRPM | DIASTOLIC BLOOD PRESSURE: 78 MMHG | BODY MASS INDEX: 22.8 KG/M2 | HEIGHT: 62 IN | WEIGHT: 123.9 LBS | HEART RATE: 63 BPM | SYSTOLIC BLOOD PRESSURE: 158 MMHG | OXYGEN SATURATION: 90 % | TEMPERATURE: 97.6 F

## 2024-12-02 DIAGNOSIS — S46.011A TRAUMATIC COMPLETE TEAR OF RIGHT ROTATOR CUFF, INITIAL ENCOUNTER: ICD-10-CM

## 2024-12-02 PROCEDURE — 25010000002 ROPIVACAINE PER 1 MG: Performed by: ANESTHESIOLOGY

## 2024-12-02 PROCEDURE — L3670 SO ACRO/CLAV CAN WEB PRE OTS: HCPCS | Performed by: ORTHOPAEDIC SURGERY

## 2024-12-02 PROCEDURE — C1713 ANCHOR/SCREW BN/BN,TIS/BN: HCPCS | Performed by: ORTHOPAEDIC SURGERY

## 2024-12-02 PROCEDURE — 25010000002 LIDOCAINE PF 2% 2 % SOLUTION: Performed by: NURSE ANESTHETIST, CERTIFIED REGISTERED

## 2024-12-02 PROCEDURE — 25010000002 CEFAZOLIN PER 500 MG: Performed by: ORTHOPAEDIC SURGERY

## 2024-12-02 PROCEDURE — 23410 REPAIR ROTATOR CUFF ACUTE: CPT | Performed by: ORTHOPAEDIC SURGERY

## 2024-12-02 PROCEDURE — 29822 SHO ARTHRS SRG LMTD DBRDMT: CPT | Performed by: ORTHOPAEDIC SURGERY

## 2024-12-02 PROCEDURE — 25010000002 FENTANYL CITRATE (PF) 50 MCG/ML SOLUTION: Performed by: ANESTHESIOLOGY

## 2024-12-02 PROCEDURE — 25810000003 LACTATED RINGERS PER 1000 ML: Performed by: ANESTHESIOLOGY

## 2024-12-02 PROCEDURE — 25010000002 PROPOFOL 10 MG/ML EMULSION: Performed by: NURSE ANESTHETIST, CERTIFIED REGISTERED

## 2024-12-02 PROCEDURE — 25010000002 ONDANSETRON PER 1 MG: Performed by: NURSE ANESTHETIST, CERTIFIED REGISTERED

## 2024-12-02 PROCEDURE — 25010000002 SUGAMMADEX 200 MG/2ML SOLUTION: Performed by: NURSE ANESTHETIST, CERTIFIED REGISTERED

## 2024-12-02 PROCEDURE — 25010000002 MIDAZOLAM PER 1MG: Performed by: ANESTHESIOLOGY

## 2024-12-02 PROCEDURE — 25010000002 DEXAMETHASONE PER 1 MG: Performed by: NURSE ANESTHETIST, CERTIFIED REGISTERED

## 2024-12-02 DEVICE — SUT/ANCH 2.6/FIBERTAK DBL/LD/W/1.3MM SUT TP SP: Type: IMPLANTABLE DEVICE | Site: SHOULDER | Status: FUNCTIONAL

## 2024-12-02 DEVICE — SUT/ANCH BIOCOMP SWIVELOCK/C 4.75X19.1MM: Type: IMPLANTABLE DEVICE | Site: SHOULDER | Status: FUNCTIONAL

## 2024-12-02 DEVICE — SUT FIBERLINK W/SUT TP 1.3MM WHT/BLU: Type: IMPLANTABLE DEVICE | Site: SHOULDER | Status: FUNCTIONAL

## 2024-12-02 RX ORDER — ROPIVACAINE HYDROCHLORIDE 5 MG/ML
INJECTION, SOLUTION EPIDURAL; INFILTRATION; PERINEURAL
Status: COMPLETED | OUTPATIENT
Start: 2024-12-02 | End: 2024-12-02

## 2024-12-02 RX ORDER — OXYCODONE HYDROCHLORIDE 5 MG/1
5 TABLET ORAL
Status: DISCONTINUED | OUTPATIENT
Start: 2024-12-02 | End: 2024-12-02 | Stop reason: HOSPADM

## 2024-12-02 RX ORDER — FENTANYL CITRATE 50 UG/ML
INJECTION, SOLUTION INTRAMUSCULAR; INTRAVENOUS
Status: COMPLETED
Start: 2024-12-02 | End: 2024-12-02

## 2024-12-02 RX ORDER — ACETAMINOPHEN 500 MG
1000 TABLET ORAL ONCE
Status: COMPLETED | OUTPATIENT
Start: 2024-12-02 | End: 2024-12-02

## 2024-12-02 RX ORDER — EPHEDRINE SULFATE 50 MG/ML
INJECTION INTRAVENOUS AS NEEDED
Status: DISCONTINUED | OUTPATIENT
Start: 2024-12-02 | End: 2024-12-02 | Stop reason: SURG

## 2024-12-02 RX ORDER — ONDANSETRON 4 MG/1
4 TABLET, FILM COATED ORAL EVERY 8 HOURS PRN
Qty: 10 TABLET | Refills: 0 | Status: SHIPPED | OUTPATIENT
Start: 2024-12-02

## 2024-12-02 RX ORDER — PROMETHAZINE HYDROCHLORIDE 12.5 MG/1
25 TABLET ORAL ONCE AS NEEDED
Status: DISCONTINUED | OUTPATIENT
Start: 2024-12-02 | End: 2024-12-02 | Stop reason: HOSPADM

## 2024-12-02 RX ORDER — DEXAMETHASONE SODIUM PHOSPHATE 4 MG/ML
INJECTION, SOLUTION INTRA-ARTICULAR; INTRALESIONAL; INTRAMUSCULAR; INTRAVENOUS; SOFT TISSUE AS NEEDED
Status: DISCONTINUED | OUTPATIENT
Start: 2024-12-02 | End: 2024-12-02 | Stop reason: SURG

## 2024-12-02 RX ORDER — SODIUM CHLORIDE, SODIUM LACTATE, POTASSIUM CHLORIDE, CALCIUM CHLORIDE 600; 310; 30; 20 MG/100ML; MG/100ML; MG/100ML; MG/100ML
9 INJECTION, SOLUTION INTRAVENOUS CONTINUOUS PRN
Status: DISCONTINUED | OUTPATIENT
Start: 2024-12-02 | End: 2024-12-02 | Stop reason: HOSPADM

## 2024-12-02 RX ORDER — ONDANSETRON 2 MG/ML
INJECTION INTRAMUSCULAR; INTRAVENOUS AS NEEDED
Status: DISCONTINUED | OUTPATIENT
Start: 2024-12-02 | End: 2024-12-02 | Stop reason: SURG

## 2024-12-02 RX ORDER — HYDROMORPHONE HYDROCHLORIDE 1 MG/ML
0.25 INJECTION, SOLUTION INTRAMUSCULAR; INTRAVENOUS; SUBCUTANEOUS
Status: DISCONTINUED | OUTPATIENT
Start: 2024-12-02 | End: 2024-12-02 | Stop reason: HOSPADM

## 2024-12-02 RX ORDER — HYDROMORPHONE HYDROCHLORIDE 1 MG/ML
0.5 INJECTION, SOLUTION INTRAMUSCULAR; INTRAVENOUS; SUBCUTANEOUS
Status: DISCONTINUED | OUTPATIENT
Start: 2024-12-02 | End: 2024-12-02 | Stop reason: HOSPADM

## 2024-12-02 RX ORDER — OXYCODONE AND ACETAMINOPHEN 7.5; 325 MG/1; MG/1
1 TABLET ORAL EVERY 4 HOURS PRN
Qty: 36 TABLET | Refills: 0 | Status: SHIPPED | OUTPATIENT
Start: 2024-12-02

## 2024-12-02 RX ORDER — METOPROLOL TARTRATE 25 MG/1
6.25 TABLET, FILM COATED ORAL ONCE AS NEEDED
Status: COMPLETED | OUTPATIENT
Start: 2024-12-02 | End: 2024-12-02

## 2024-12-02 RX ORDER — MIDAZOLAM HYDROCHLORIDE 2 MG/2ML
2 INJECTION, SOLUTION INTRAMUSCULAR; INTRAVENOUS ONCE
Status: COMPLETED | OUTPATIENT
Start: 2024-12-02 | End: 2024-12-02

## 2024-12-02 RX ORDER — ONDANSETRON 2 MG/ML
4 INJECTION INTRAMUSCULAR; INTRAVENOUS ONCE AS NEEDED
Status: DISCONTINUED | OUTPATIENT
Start: 2024-12-02 | End: 2024-12-02 | Stop reason: HOSPADM

## 2024-12-02 RX ORDER — ROCURONIUM BROMIDE 10 MG/ML
INJECTION, SOLUTION INTRAVENOUS AS NEEDED
Status: DISCONTINUED | OUTPATIENT
Start: 2024-12-02 | End: 2024-12-02 | Stop reason: SURG

## 2024-12-02 RX ORDER — FENTANYL CITRATE 50 UG/ML
INJECTION, SOLUTION INTRAMUSCULAR; INTRAVENOUS AS NEEDED
Status: DISCONTINUED | OUTPATIENT
Start: 2024-12-02 | End: 2024-12-02 | Stop reason: SURG

## 2024-12-02 RX ORDER — PROMETHAZINE HYDROCHLORIDE 25 MG/1
25 SUPPOSITORY RECTAL ONCE AS NEEDED
Status: DISCONTINUED | OUTPATIENT
Start: 2024-12-02 | End: 2024-12-02 | Stop reason: HOSPADM

## 2024-12-02 RX ORDER — PROPOFOL 10 MG/ML
VIAL (ML) INTRAVENOUS AS NEEDED
Status: DISCONTINUED | OUTPATIENT
Start: 2024-12-02 | End: 2024-12-02 | Stop reason: SURG

## 2024-12-02 RX ORDER — LIDOCAINE HYDROCHLORIDE 20 MG/ML
INJECTION, SOLUTION EPIDURAL; INFILTRATION; INTRACAUDAL; PERINEURAL AS NEEDED
Status: DISCONTINUED | OUTPATIENT
Start: 2024-12-02 | End: 2024-12-02 | Stop reason: SURG

## 2024-12-02 RX ORDER — ALBUTEROL SULFATE 0.83 MG/ML
SOLUTION RESPIRATORY (INHALATION) AS NEEDED
Status: DISCONTINUED | OUTPATIENT
Start: 2024-12-02 | End: 2024-12-02 | Stop reason: SURG

## 2024-12-02 RX ORDER — SCOLOPAMINE TRANSDERMAL SYSTEM 1 MG/1
1 PATCH, EXTENDED RELEASE TRANSDERMAL ONCE
Status: DISCONTINUED | OUTPATIENT
Start: 2024-12-02 | End: 2024-12-02 | Stop reason: HOSPADM

## 2024-12-02 RX ADMIN — ROCURONIUM BROMIDE 50 MG: 10 INJECTION, SOLUTION INTRAVENOUS at 07:46

## 2024-12-02 RX ADMIN — ALBUTEROL SULFATE 2.5 MG: 2.5 SOLUTION RESPIRATORY (INHALATION) at 08:03

## 2024-12-02 RX ADMIN — SODIUM CHLORIDE 2 G: 9 INJECTION, SOLUTION INTRAVENOUS at 07:52

## 2024-12-02 RX ADMIN — METOPROLOL TARTRATE 6.25 MG: 25 TABLET, FILM COATED ORAL at 07:29

## 2024-12-02 RX ADMIN — SODIUM CHLORIDE, POTASSIUM CHLORIDE, SODIUM LACTATE AND CALCIUM CHLORIDE: 600; 310; 30; 20 INJECTION, SOLUTION INTRAVENOUS at 08:35

## 2024-12-02 RX ADMIN — PROPOFOL 150 MG: 10 INJECTION, EMULSION INTRAVENOUS at 07:46

## 2024-12-02 RX ADMIN — ROPIVACAINE HYDROCHLORIDE 30 ML: 5 INJECTION, SOLUTION EPIDURAL; INFILTRATION; PERINEURAL at 07:25

## 2024-12-02 RX ADMIN — EPHEDRINE SULFATE 10 MG: 50 INJECTION INTRAVENOUS at 07:57

## 2024-12-02 RX ADMIN — FENTANYL CITRATE 50 MCG: 50 INJECTION, SOLUTION INTRAMUSCULAR; INTRAVENOUS at 07:19

## 2024-12-02 RX ADMIN — DEXAMETHASONE SODIUM PHOSPHATE 4 MG: 4 INJECTION, SOLUTION INTRAMUSCULAR; INTRAVENOUS at 08:04

## 2024-12-02 RX ADMIN — EPHEDRINE SULFATE 10 MG: 50 INJECTION INTRAVENOUS at 07:55

## 2024-12-02 RX ADMIN — SUGAMMADEX 200 MG: 100 INJECTION, SOLUTION INTRAVENOUS at 09:10

## 2024-12-02 RX ADMIN — EPHEDRINE SULFATE 10 MG: 50 INJECTION INTRAVENOUS at 08:29

## 2024-12-02 RX ADMIN — ONDANSETRON HYDROCHLORIDE 4 MG: 2 SOLUTION INTRAMUSCULAR; INTRAVENOUS at 08:57

## 2024-12-02 RX ADMIN — SODIUM CHLORIDE, POTASSIUM CHLORIDE, SODIUM LACTATE AND CALCIUM CHLORIDE 9 ML/HR: 600; 310; 30; 20 INJECTION, SOLUTION INTRAVENOUS at 07:00

## 2024-12-02 RX ADMIN — SCOPOLAMINE 1 PATCH: 1.5 PATCH, EXTENDED RELEASE TRANSDERMAL at 07:17

## 2024-12-02 RX ADMIN — MIDAZOLAM HYDROCHLORIDE 2 MG: 1 INJECTION, SOLUTION INTRAMUSCULAR; INTRAVENOUS at 07:19

## 2024-12-02 RX ADMIN — EPHEDRINE SULFATE 10 MG: 50 INJECTION INTRAVENOUS at 08:50

## 2024-12-02 RX ADMIN — FENTANYL CITRATE 50 MCG: 50 INJECTION, SOLUTION INTRAMUSCULAR; INTRAVENOUS at 07:46

## 2024-12-02 RX ADMIN — LIDOCAINE HYDROCHLORIDE 60 MG: 20 INJECTION, SOLUTION INTRAVENOUS at 07:46

## 2024-12-02 RX ADMIN — ACETAMINOPHEN 500 MG: 500 TABLET ORAL at 07:17

## 2024-12-02 NOTE — OP NOTE
SHOULDER ARTHROSCOPY WITH ROTATOR CUFF REPAIR  Procedure Report    Patient Name:  Shannon Mondragon  YOB: 1960    Date of Surgery:  12/2/2024     Indications:  Patient has rotator cuff tear and has failed conservative treatment. Risks and benefits of surgery were discussed, including bleeding, infection, damage to neurovascular structures, anesthesia complications including death, continued pain and disability, need for additional procedures, among others. Informed consent was obtained and they wished to proceed.    Pre-op Diagnosis:   Traumatic complete tear of right rotator cuff, initial encounter [S46.011A]       Post-Op Diagnosis Codes:     * Traumatic complete tear of right rotator cuff, initial encounter [S46.011A]    Procedure/CPT® Codes:      Procedure(s):  RIGHT SHOULDER ARTHROSCOPY WITH MINI OPEN ROTATOR CUFF REPAIR, SUBCROMIAL DECOMPRESSION    Staff:  Surgeon(s):  Buddy Crook MD    Assistant: Megan Salas Christopher, RN    Anesthesia: General, local block    Estimated Blood Loss:  20cc    Implants:    Implant Name Type Inv. Item Serial No.  Lot No. LRB No. Used Action   SUT/ANCH 2.6/FIBERTAK DBL/LD/W/1.3MM SUT TP SP - [id number] Implant SUT/ANCH 2.6/FIBERTAK DBL/LD/W/1.3MM SUT TP SP  [employer] [id number] Right 2 Implanted   SUT/ANCH BIOCOMP SWIVELOCK/C 4.75X19.1MM - [id number] Implant SUT/ANCH BIOCOMP SWIVELOCK/C 4.75X19.1MM  [employer] [id number] Right 2 Implanted   SUT FIBERLINK W/SUT TP 1.3MM WHT/CANDY - [id number] Implant SUT FIBERLINK W/SUT TP 1.3MM WHT/CANDY  [employer] [id number] Right 1 Implanted       Specimen:          None        Findings: rotator cuff tear    Complications: none    Description of Procedure: The operative site was marked in preoperative holding area.  Interscalene nerve block was performed by the anesthesia provider.  The patient was brought the operating room and general anesthesia was applied.  There were placed in the lateral decubitus  position.  An axillary roll was placed and the patient was secured with a deflated beanbag.  The legs were padded and SCD boots were placed.  The contralateral limb was placed on an arm board and the affected limb was prepped and draped in usual sterile fashion and placed into a sterile traction arm lopez.  Preoperative antibiotic was given.  Formal timeout was held.    Standard posterior portal was established and the arthroscope was inserted into the glenohumeral joint.  An anterior portal was established in the rotator cuff interval and a cannula was placed anteriorly.  The joint was inspected and the intra-articular and findings included a large rotator cuff tear involving the entire supraspinatus and anterior aspect of the infraspinatus without significant retraction.  Glenohumeral cartilage was intact with minimal chondromalacia.  The labrum was intact without tearing.  The biceps tendon was intact.  There is some fraying of the upper border of the subscapularis without high-grade tearing.    The arthroscope was removed and reinserted into the subacromial space posteriorly.  The lateral portal was established of the lateral acromion.  A motorized shaver and ablation probe were used to clear the subacromial space of adhesions and bursitis.  A motorized bur was used to resect approximately 1 cm from the anterior acromion and flatten the acromion from anterior to posterior and lateral to medial.    The subacromial findings include large rotator cuff involving the supraspinatus tendon and anterior aspect of the infraspinatus without significant retraction.  This was a crescent shaped tear.    At this point the arthroscope was removed and the lateral portal was extended into a 3 cm incision.  The deltoid was split and the bursa was excised.  The rotator cuff tear was identified and mobilized.  A bleeding surface was treated at the greater tuberosity and twoArthrex fiber tack anchors were placed.  A scorpion suture  passer was used to pass sutures through the rotator cuff and they were tied in alternating  knot fashion.  1 suture from each anchor was then passed laterally through a swivel lock anchor which was placed anteriorly and posteriorly.  The sutures were tensioned and the anchor was deployed.  The sutures were cut and the repair was secure.    The wound was irrigated and was closed with #1 Vicryl at the deltoid fascia level, 2-0 Vicryl at the subcutaneous level, and running Monocryl suture at the skin.  Mastisol Steri-Strips and sterile dressing was placed.  The portal incisions were closed with nylon suture.      Sterile dressing was placed.  The patient was placed into cold therapy and an immobilizer.  The patient awoke from anesthesia in stable condition.  There is no complications.  All counts were correct.    Assistant: Megan Salas Christopher, RN  was responsible for performing the following activities: Retraction, Suction, Irrigation, and Placing Dressing and their skilled assistance was necessary for the success of this case.    SURGICAL APPROACH: Double Row            Buddy Crook MD     Date: 12/2/2024  Time: 09:11 EST

## 2024-12-02 NOTE — ANESTHESIA POSTPROCEDURE EVALUATION
Patient: Shannon Mondragon    Procedure Summary       Date: 12/02/24 Room / Location: [facility]  / [facility]    Anesthesia Start: 0742 Anesthesia Stop: 0919    Procedure: RIGHT SHOULDER ARTHROSCOPY WITH ROTATOR CUFF REPAIR, SUBCROMIAL DECOMPRESSION, POSSIBLE DISTAL CLAVICLE EXCISION (Right: Shoulder) Diagnosis:       Traumatic complete tear of right rotator cuff, initial encounter      (Traumatic complete tear of right rotator cuff, initial encounter [S46.011A])    Surgeons: Buddy Crook MD Provider: Criss Galeano MD    Anesthesia Type: general, general with block ASA Status: 3            Anesthesia Type: general, general with block    Vitals  Vitals Value Taken Time   /79 12/02/24 0942   Temp 36.1 °C (97 °F) 12/02/24 0917   Pulse 75 12/02/24 0946   Resp 22 12/02/24 0940   SpO2 95 % 12/02/24 0946   Vitals shown include unfiled device data.        Post Anesthesia Care and Evaluation    Patient location during evaluation: bedside  Patient participation: complete - patient participated  Level of consciousness: awake  Pain management: adequate    Airway patency: patent  PONV Status: none  Cardiovascular status: acceptable and stable  Respiratory status: acceptable  Hydration status: acceptable

## 2024-12-02 NOTE — ANESTHESIA PREPROCEDURE EVALUATION
Anesthesia Evaluation     Patient summary reviewed and Nursing notes reviewed   no history of anesthetic complications:   NPO Solid Status: > 8 hours  NPO Liquid Status: > 2 hours           Airway   Mallampati: II  TM distance: >3 FB  Neck ROM: full  No difficulty expected  Dental    (+) lower dentures and upper dentures    Pulmonary - normal exam    breath sounds clear to auscultation  (+) COPD (albuterol),shortness of breath  Cardiovascular - normal exam  Exercise tolerance: good (4-7 METS)    Patient on routine beta blocker and Beta blocker given within 24 hours of surgery  Rhythm: regular  Rate: normal    (+) hypertension (lisinopril, hctz, propranolol) 2 medications or greater    ROS comment: Echo (9/6/24; chest pain):  ·  Impressions are consistent with an intermediate risk study.  ·  Left ventricular ejection fraction is normal (Calculated EF = 50%).  ·  Myocardial perfusion imaging indicates a small-to-moderate-sized infarct located in the anterior wall and septal wall with mild joylnn-infarct ischemia.      Cath (9/27/24; abnl stress test):  ·  Successful right and left coronary angiogram and LV gram  ·  Normal coronary arteries  ·  Normal LV gram          Neuro/Psych- negative ROS  GI/Hepatic/Renal/Endo    (+) GERD well controlled    Musculoskeletal (-) negative ROS    Abdominal  - normal exam   Substance History - negative use     OB/GYN negative ob/gyn ROS         Other - negative ROS       ROS/Med Hx Other: >4METS.HX HTN,COPD,GERD,C/P. ECHO 9/11/24 NORMAL VALVES,STRESS9/6/24 ABN,CATH 9/27/24 NORMAL CORONARY ARTERIES,EF 60%.CARDS OV 10/11/24 CLEARED WITH NONMODIFIABLE RISKS,MED DIRECTIVES. DENIES FURTHER C/P, SOA. KT                     Anesthesia Plan    ASA 3     general and general with block     (Regional anes discussed with patient and patient and family agree to block.)  intravenous induction     Anesthetic plan, risks, benefits, and alternatives have been provided, discussed and informed consent  has been obtained with: patient.    Use of blood products discussed with patient .    Plan discussed with CRNA.        CODE STATUS:

## 2024-12-02 NOTE — DISCHARGE INSTRUCTIONS
DISCHARGE INSTRUCTIONS  Post-Operative  Arthroscopic Shoulder Surgery      For your surgery you had:  General anesthesia (you may have a sore throat for the first 24 hours)  You received an anesthesia medication today that can cause hormonal forms of birth control to be ineffective. You should use a different form of birth control (to prevent pregnancy) for 7 days.   IV sedation.  Local anesthesia  Monitored anesthesia care  You may experience dizziness, drowsiness, or light-headedness for several hours following surgery/procedure.  Do not stay alone today or tonight.  Limit your activity for 24 hours.  Resume your diet slowly.  Follow whatever special dietary instructions you may have been given by your doctor.  You should not drive or operate machinery or drink alcohol for 24 hours or while you are taking pain medication.  You should not sign legally binding documents.  Post-Operative Day #1 is counted as the 1st day after surgery.  [x] If you had a regional block, you will not have control of your arm for up to 12 hours.  Protect the arm with a sling or follow your physician's specific instructions.  Post-Operative Day #2  Remove your dressing.  Under the dressing you will either have sutures or staples.  Change dressing once or twice daily.  Place a dry dressing or band-aids over the small incisions.  Prior to dressing change, wash your hands.  Post-Operative Day #2  You may shower.  During the shower, you may let the water hit the incision and roll down but do not scrub or place any soap on the incision.  Do not soak it.  Pat it dry and do not put any ointments on the incision unless directed by surgeon. Then replace the dry dressing.    General Instructions  [x] Use ice pack to shoulder for 72 hours.  This can help with reducing swelling and pain relief.   Never place ice directly on skin.  Avoid getting dressing wet. Change out blue packs every 4 hours for 72 hours.    Keep arm elevated with sling to  decrease swelling and minimize throbbing pain.  The sling will provide support for your shoulder.  It is important to remove the sling 3-5 times daily to work on range-of-motion of the elbow, wrist and hand.  You will receive a prescription for physical therapy, unless otherwise instructed by physician.  After most shoulder surgeries, it is permissible to start exercises by slowing trying to crawl your fingers across a table until your arm is straight.  While doing this support the affected arm at the elbow or closer to the shoulder.  You may also lean over and let the arm and hand do small or large circles or write the alphabet with your hanging arm to keep it loose.  It is normal to have some pain with these gentle exercises.  The exercises help reduce swelling and pain overall and help to avoid a stiff shoulder post-operatively.  It is okay to come out of the sling for showering or for certain activities of daily living but avoid any lifting or carrying with the affected arm until instructed by the physician especially if you have had a repair of the rotator cuff or some type of reconstructive procedure.  It is okay to come out of the sling and support the arm with a pillow if you remain sedentary.  In general, sling use is preferred following a repair or reconstruction for 4 weeks.  If you have had a SAD (sub acromial decompression), continue sling use more liberally because there was not a repair or reconstruction that could be harmed.          DISCHARGE INSTRUCTIONS  Post-Operative   Arthroscopic Shoulder Surgery      Exercise fingers for 10 minutes every hour while awake.  The physician will typically inform the family and the patient whether or not a repair or reconstruction could be harmed.  If you have had a rotator cuff repair or reconstructive procedure, do not let the arm drop down suddenly from an elevated position down to your side despite improvements made in pain and over the 3 months following  repair and reconstruction.  It generally takes 8-12 weeks before the repair or reconstruction does not rely on sutures and anchors that are placed for the repair.  You are generally given a prescription for a narcotic medication.  Take as prescribed.  You may use over-the-counter anti-inflammatory medications such as Motrin or Aleve for additional pain or fever reduction if not allergic.  If you are taking the pain medication prescribed, do not take Tylenol too unless you consult with the pharmacist. The pain medications generally have Tylenol in them and too much Tylenol can be harmful.  Sometimes it is recommended to take an anti-inflammatory in between doses of prescription pain medication if additional pain relief is needed.  Consult with your physician or your pharmacist before taking over-the-counter pain medications/anti-inflammatories.  It is not uncommon to have a fever post-operatively especially in the first 24-48 hours.  Anti-inflammatories can be used for fever reduction also.  It is normal to have some redness or irritation around skin sutures or staples, however, if you have any expanding areas of redness with a persistent fever and increasing pain notify the physician as soon as possible.  The incidence of blood clots is low following arthroscopic procedures, however, if you notice any increasing pain or swelling in your calves or legs, contact the physician as soon as possible.   It is difficult to sleep in the customary fashion following a shoulder surgery.  It is usually necessary to sleep with the shoulder above the level of the heart such as in a recliner, couch or with the head of the bed elevated.  This should slowly improve over the weeks following shoulder surgery.  If unable to urinate 6 to 8 hours after surgery or urinating frequently in small amounts, notify the physician or go to the nearest Emergency Room.    NOTIFY YOUR PHYSICIAN IF YOU EXPERIENCE:  Numbness of fingers.  Inability to  move fingers.  Extreme coldness, paleness or blue dis-coloration of fingers.  Any pain other than from the incision, such as swelling of the arm that blocks circulation of fingers.  Follow verbal instructions from your doctor.  Medications per physician instructions as indicated on Discharge Medication Information Sheet.  Missing your appointment/follow-up could lead to serious complications  If you have an emergency and cannot reach your doctor, go to an Emergency Room nearest your home.

## 2024-12-02 NOTE — ANESTHESIA PROCEDURE NOTES
Peripheral Block    Pre-sedation assessment completed: 12/2/2024 7:03 AM    Patient reassessed immediately prior to procedure    Patient location during procedure: pre-op  Start time: 12/2/2024 7:19 AM  Stop time: 12/2/2024 7:25 AM  Reason for block: at surgeon's request and post-op pain management  Preanesthetic Checklist  Completed: patient identified, IV checked, site marked, risks and benefits discussed, surgical consent, monitors and equipment checked, pre-op evaluation and timeout performed  Prep:  Pt Position: supine (HOB elevated)  Sterile barriers:cap, washed/disinfected hands, sterile barriers, gloves, mask, partial drape and alcohol skin prep  Prep: ChloraPrep  Patient monitoring: blood pressure monitoring, continuous pulse oximetry and EKG  Procedure    Sedation: yes  Performed under: local infiltration  Guidance:ultrasound guided and nerve stimulator    ULTRASOUND INTERPRETATION.  Using ultrasound guidance a 22 G gauge needle was placed in close proximity to the brachial plexus nerve, at which point, under ultrasound guidance anesthetic was injected in the area of the nerve and spread of the anesthesia was seen on ultrasound in close proximity thereto.  There were no abnormalities seen on ultrasound; a digital image was taken; and the patient tolerated the procedure with no complications. Images:still images obtained, printed/placed on chart    Laterality:right  Block Type:interscalene  Injection Technique:single-shot  Needle Type:echogenic  Needle Gauge:22 G (2in)  Resistance on Injection: none    Medications Used: ropivacaine (NAROPIN) 0.5 % injection - Injection   30 mL - 12/2/2024 7:25:00 AM      Post Assessment  Injection Assessment: negative aspiration for heme, no paresthesia on injection and incremental injection  Patient Tolerance:comfortable throughout block  Complications:no  Additional Notes  The block or continuous infusion is requested by the referring physician for management of  postoperative pain, or pain related to a procedure. Ultrasound guidance (deemed medically necessary). Painless injection, pt was awake and conversant during the procedure without complications. Needle and surrounding structures visualized throughout procedure. No adverse reactions or complications seen during this period. Post-procedure image showed no signs of complication, and anatomy was consistent with an uncomplicated nerve blockade.  Performed by: Criss Galeano MD

## 2024-12-09 ENCOUNTER — TREATMENT (OUTPATIENT)
Dept: PHYSICAL THERAPY | Facility: CLINIC | Age: 64
End: 2024-12-09
Payer: COMMERCIAL

## 2024-12-09 DIAGNOSIS — Z98.890 STATUS POST RIGHT ROTATOR CUFF REPAIR: ICD-10-CM

## 2024-12-09 DIAGNOSIS — M25.511 ACUTE POSTOPERATIVE PAIN OF RIGHT SHOULDER: Primary | ICD-10-CM

## 2024-12-09 DIAGNOSIS — G89.18 ACUTE POSTOPERATIVE PAIN OF RIGHT SHOULDER: Primary | ICD-10-CM

## 2024-12-09 PROCEDURE — 97162 PT EVAL MOD COMPLEX 30 MIN: CPT | Performed by: PHYSICAL THERAPIST

## 2024-12-09 PROCEDURE — 97110 THERAPEUTIC EXERCISES: CPT | Performed by: PHYSICAL THERAPIST

## 2024-12-09 PROCEDURE — 97140 MANUAL THERAPY 1/> REGIONS: CPT | Performed by: PHYSICAL THERAPIST

## 2024-12-09 NOTE — PROGRESS NOTES
"Physical Therapy Initial Evaluation and Plan of Care  [address], [address], [place] [id number]    Patient: [place] Mondr[place]   : 1960  Diagnosis/ICD-10 Code:  Acute postoperative pain of right shoulder [G89.18, M25.511]  Referring practitioner: Buddy Crook MD    Subjective Evaluation    History of Present Illness  Date of surgery: 2024  Mechanism of injury: S/p (R) RCR 2024 by Dr. Crook. Patient reports the pain has been \"ungodly.\"  Reports she is sleeping upright on the couch with her sling donned. She is compliant with sling wear but reports a nurse cut the strap on her sling and she thinks it is too short.       Patient reports her daughter is helping her shower, dress, and perform self-care tasks.  She is avoiding (R) shoulder AROM and compliant with performance on pendulums a few times daily.  She reports the pain is so bad it makes her cry, but she can tell it is improving.    PMH notable for HTN, COPD, and restless leg syndrome.       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  Progression: improved    Social Support  Lives with: spouse (currently staying with adult daughter in [place] but lives in [place] with her )    Hand dominance: left    Diagnostic Tests  No diagnostic tests performed    Patient Goals  Patient goals for therapy: decreased pain, increased motion, increased strength and return to work           Subjective Questionnaire: " QuickDASH: 45.45%    Objective          Observations     Right Shoulder  Positive for incision.     Additional Shoulder Observation Details  (R) shoulder: 1 anterior portal incision with intact suture, 1 posterior portal incision with intact suture, and 1 anterolateral shoulder incision along anterior proximal deltoid with tegaderm intact along length of incision.  No signs or symptoms of infection present.    Active Range of Motion   Left Shoulder   Flexion: 168 degrees   Extension: 72 degrees   Abduction: 180 degrees   External rotation 90°: 76 degrees   Internal rotation 90°: 63 degrees     Left Elbow   Flexion: 152 degrees   Extension: 0 degrees     Additional Active Range of Motion Details  (R) UE AROM deferred due to recent post-op status    Passive Range of Motion     Right Shoulder   Flexion: 80 degrees with pain  External rotation 0°: 15 degrees     Right Elbow   Flexion: 146 degrees   Extension: 10 degrees     Additional Passive Range of Motion Details  (R) elbow PROM is painfree    Strength/Myotome Testing     Left Shoulder     Planes of Motion   Flexion: 4   Extension: 4+   Abduction: 4   External rotation at 0°: 4-   Internal rotation at 0°: 4-     Left Elbow   Flexion: 4  Extension: 4-    Additional Strength Details  (R) UE MMT deferred due to recent post-op status          Assessment & Plan       Assessment  Impairments: abnormal muscle firing, abnormal or restricted ROM, activity intolerance, impaired physical strength, lacks appropriate home exercise program and pain with function   Functional limitations: carrying objects, lifting, sleeping, pulling, pushing, uncomfortable because of pain, moving in bed, reaching behind back, reaching overhead and unable to perform repetitive tasks   Assessment details: Patient is a 64 y.o female s/p (R) RCR on 12/02/2024.  She has been compliant with sling wear and recovery has been uncomplicated.  She reports the pain is significant at times, rated 7-10/10, and  interferes with sleep.  She requires assistance for all ADLs at this time as well as donning/doffing sling.  She exhibits well-healing incisions (R) shoulder, decreased and painful (R) shoulder PROM, and decreased (R) shoulder AROM and strength (not formally assessed today).  Her QuickDASH score is 45.45% indicating a moderate perceived level of functional limitation (of note, patient is LHD).  She will benefit from skilled PT services to address these deficits and assist in painfree return to all independent ADLs for improved QOL.  Prognosis: good    Goals  Plan Goals: STGs: to be met in 6 weeks  1. Patient will be independent with initial HEP  2. Patient will report improved (R) shoulder symptoms 2-5/10 for improved tolerance to ADLs  3. Patient will report consistently sleeping without waking more than once nightly due to pain for improved restorative healing  4. Patient will demonstrate improved (R) shoulder PROM flexion 150 deg, abduction 120 deg, and ER 45 deg for improved positional tolerance  5. Patient will report dressing and bathing independently for improved independence with ADLs    LTGs: to be met in 12 weeks  1. Patient will be independent with progressed HEP  2. Patient will report improved (R) shoulder symptoms 0-2/10 for improved tolerance to functional activities involving (R) UE  3. Patient will demonstrate (R) shoulder PROM WNL all planes for improved positional tolerance  4. Patient will demonstrate improved (R) shoulder flexion >/= 140 deg and functional ER behind head and IR behind back for improved functional reaching  5. Patient will demonstrate improved (R) UE strength grossly >/= 4+/5 for improved functional use of (R) UE  6. Patient will have improved QuickDASH score </= 20% for subjective evidence of functional improvement    Plan  Therapy options: will be seen for skilled therapy services  Planned modality interventions: cryotherapy, thermotherapy (hydrocollator packs) and electrical  stimulation/Zimbabwean stimulation  Planned therapy interventions: ADL retraining, flexibility, functional ROM exercises, home exercise program, joint mobilization, manual therapy, neuromuscular re-education, soft tissue mobilization, strengthening, stretching and therapeutic activities  Frequency: 2x week  Duration in weeks: 12  Treatment plan discussed with: patient        Manual Therapy:    16     mins  91019;  Therapeutic Exercise:    12     mins  25986;     Neuromuscular Wilfredo:        mins  05240;    Therapeutic Activity:          mins  13931;     Gait Training:           mins  39732;     Ultrasound:          mins  72263;    Electrical Stimulation:         mins  28471 ( );  Dry Needling          mins self-pay    Timed Treatment:   28   mins   Total Treatment:     42   mins    PT SIGNATURE: Afsaneh London PT, DPT, OCS  Electronically signed by: Afsaneh London PT, 12/09/24, 2:34 PM EST  [place] License #[id number]     DATE TREATMENT INITIATED: 12/9/2024    Initial Certification  Certification Period: 12/9/2024 thru 3/8/2025  I certify that the therapy services are furnished while this patient is under my care.  The services outlined above are required by this patient, and will be reviewed every 90 days.     PHYSICIAN: Buddy Crook MD  [id number]                                          DATE:     Please sign and return via fax to [phone]. Thank you, [facility].

## 2024-12-11 ENCOUNTER — TREATMENT (OUTPATIENT)
Dept: PHYSICAL THERAPY | Facility: CLINIC | Age: 64
End: 2024-12-11
Payer: COMMERCIAL

## 2024-12-11 ENCOUNTER — TELEPHONE (OUTPATIENT)
Dept: ORTHOPEDIC SURGERY | Facility: CLINIC | Age: 64
End: 2024-12-11
Payer: COMMERCIAL

## 2024-12-11 DIAGNOSIS — Z98.890 STATUS POST RIGHT ROTATOR CUFF REPAIR: ICD-10-CM

## 2024-12-11 DIAGNOSIS — S46.011A TRAUMATIC COMPLETE TEAR OF RIGHT ROTATOR CUFF, INITIAL ENCOUNTER: ICD-10-CM

## 2024-12-11 DIAGNOSIS — M25.511 ACUTE POSTOPERATIVE PAIN OF RIGHT SHOULDER: Primary | ICD-10-CM

## 2024-12-11 DIAGNOSIS — G89.18 ACUTE POSTOPERATIVE PAIN OF RIGHT SHOULDER: Primary | ICD-10-CM

## 2024-12-11 PROCEDURE — 97140 MANUAL THERAPY 1/> REGIONS: CPT | Performed by: PHYSICAL THERAPIST

## 2024-12-11 PROCEDURE — 97110 THERAPEUTIC EXERCISES: CPT | Performed by: PHYSICAL THERAPIST

## 2024-12-11 RX ORDER — OXYCODONE AND ACETAMINOPHEN 7.5; 325 MG/1; MG/1
1 TABLET ORAL EVERY 4 HOURS PRN
Qty: 42 TABLET | Refills: 0 | Status: SHIPPED | OUTPATIENT
Start: 2024-12-11

## 2024-12-11 NOTE — TELEPHONE ENCOUNTER
Caller: PAT SIMMONS    Relationship: Emergency Contact    Best call back number: [phone]    Requested Prescriptions:   oxyCODONE-acetaminophen (PERCOCET) 7.5-325 MG per tablet [74717] (Order [id number])     [facility]  Last office visit with prescribing clinician: 11/7/2024   Last telemedicine visit with prescribing clinician: Visit date not found   Next office visit with prescribing clinician: Visit date not found     Additional details provided by patient:     Does the patient have less than a 3 day supply:  [] Yes  [x] No    Would you like a call back once the refill request has been completed: [x] Yes [] No    If the office needs to give you a call back, can they leave a voicemail: [x] Yes [] No    Orlando Solano Rep   12/11/24 11:57 EST

## 2024-12-11 NOTE — PROGRESS NOTES
Physical Therapy Daily Treatment Note               [address]                                                                                                                                             [place] [address]    Patient: Shannon Mondragon   : 1960  Referring practitioner: No ref. provider found  Date of Initial Visit: Type: THERAPY  Noted: 2024  Today's Date: 2024  Patient seen for 2 sessions       Visit Diagnoses:    ICD-10-CM ICD-9-CM   1. Acute postoperative pain of right shoulder  G89.18 719.41    M25.511 338.18   2. Status post right rotator cuff repair  Z98.890 V45.89       Subjective Evaluation    History of Present Illness    Subjective comment: Pt reports that her (R) shoulder is feeling a lot better than it was at the first appointment.       Objective   See Exercise, Manual, and Modality Logs for complete treatment.       Assessment & Plan       Assessment  Assessment details: Pt completed treatment with moderate visible signs/vc pain in (R) shoulder. Pt benefits from vc to relax her (R) shoulder during manual PROM stretching. Pt has been compliant with her HEP.  Plan to progress per POC.          Timed:         Manual Therapy:    12     mins  04948;     Therapeutic Exercise:    13     mins  70061;     Neuromuscular Wilfredo:    0    mins  29058;    Therapeutic Activity:     0     mins  21340;     Gait Trainin     mins  72164;     Ultrasound:     0     mins  42884;    E Stim                            0    mins   55470( [id number])  Work Rogers/Cond      0    mins   57629        Timed Treatment:   25   mins   Total Treatment:     25   mins    Boom Saxena PTA  [place] License: [id number]

## 2024-12-16 ENCOUNTER — OFFICE VISIT (OUTPATIENT)
Dept: ORTHOPEDIC SURGERY | Facility: CLINIC | Age: 64
End: 2024-12-16
Payer: OTHER MISCELLANEOUS

## 2024-12-16 VITALS
BODY MASS INDEX: 22.63 KG/M2 | HEIGHT: 62 IN | WEIGHT: 123 LBS | SYSTOLIC BLOOD PRESSURE: 191 MMHG | DIASTOLIC BLOOD PRESSURE: 108 MMHG | HEART RATE: 70 BPM | OXYGEN SATURATION: 90 %

## 2024-12-16 DIAGNOSIS — Z47.89 AFTERCARE FOLLOWING SURGERY OF THE MUSCULOSKELETAL SYSTEM: Primary | ICD-10-CM

## 2024-12-16 PROCEDURE — 99024 POSTOP FOLLOW-UP VISIT: CPT | Performed by: PHYSICIAN ASSISTANT

## 2024-12-16 NOTE — PROGRESS NOTES
Chief Complaint  Follow-up of the Right Shoulder and Suture / Staple Removal    Subjective          History of Present Illness      Shannon Mondragon is a 64 y.o. female  presents to [facility] for     Patient presents with her daughter Lila for 2-week postoperative evaluation of right shoulder arthroscopic subacromial decompression and mini open rotator cuff repair, 12/2/2024.  Patient presents without her sling she left at home she mainly states she wears it at night she states around the house she has not been wearing it, she was instructed that she needs to wear it with activity especially on her feet for the next 4 weeks, she states she will comply.  Patient is taking pain medication she just got a refill but would like to switch to hydrocodone when she is done with the current refill.  She is also taking antinausea medicine with the Percocet.  Patient is attending therapy at [facility].  They state the incisions have been healing well and deny redness drainage or dehiscence, sutures were removed and Steri-Strips were placed.  Patient denies numbness and tingling      Allergies   Allergen Reactions    Oxycodone Nausea Only        Social History     Socioeconomic History    Marital status:    Tobacco Use    Smoking status: Former     Types: Cigarettes     Passive exposure: Never    Smokeless tobacco: Never    Tobacco comments:     Quit 17 years ago   Vaping Use    Vaping status: Never Used   Substance and Sexual Activity    Alcohol use: Not Currently    Drug use: Never    Sexual activity: Defer        REVIEW OF SYSTEMS    Constitutional: Awake alert and oriented x3, no acute distress, denies fevers, chills, weight loss  Respiratory: No respiratory distress  Vascular: Brisk cap refill, Intact distal pulses, No cyanosis, compartments soft with no signs or symptoms of compartment syndrome or DVT.   Cardiovascular: Denies chest pain, shortness of breath  Skin: Denies rashes, acute  "skin changes  Neurologic: Denies headache, loss of consciousness  MSK: Right shoulder pain      Objective   Vital Signs:   BP (!) 191/108 Comment: PROVIDER AWARE  Pulse 70   Ht 157.5 cm (62.01\")   Wt 55.8 kg (123 lb)   SpO2 90%   BMI 22.49 kg/m²     Body mass index is 22.49 kg/m².    Physical Exam       Right shoulder: Incisions are healing well, no erythema, no drainage or dehiscence, no signs of infection, mild swelling with resolving ecchymosis to the bicep region elbow wrist hand range of motion intact/appropriate, shoulder range of motion limited secondary stiffness and pain, sensation intact to light touch.      Procedures    Imaging Results (Most Recent)       None             Result Review :   The following data was reviewed by: FATOU Fairbanks on 12/16/2024:               Assessment and Plan    Diagnoses and all orders for this visit:    1. Aftercare following surgery of RIGHT SHOULDER ARTHROSCOPY WITH MINI OPEN ROTATOR CUFF REPAIR, SUBCROMIAL DECOMPRESSION  12/2/24 (Primary)        Reviewed operative report, operative imaging with the patient and her daughter they were advised patient can continue therapy, continue pain meds as needed they will call for refill and they would like to switch to hydrocodone for the next refill.  We discussed sling use for the next 4 weeks, avoid heavy lift push pull or active range of motion was discussed,.  Sutures/staples removed in office today. Steri-strips applied. Discussed incision care/hygiene. May shower, but do not submerge in water until fully healed.  Advised patient that if any concerning symptoms regarding incision appearance occur that they should call us right away, patient expressed understanding.  Follow-up in 4 weeks for recheck    Call or return if worsening symptoms.    Follow Up   Return in about 4 weeks (around 1/13/2025) for Recheck.  Patient was given instructions and counseling regarding her condition or for health maintenance " advice. Please see specific information pulled into the AVS if appropriate.       EMR Dragon/Transcription disclaimer:  Part of this note may be an electronic transcription/translation of spoken language to printed text using the Dragon Dictation System

## 2024-12-17 ENCOUNTER — TREATMENT (OUTPATIENT)
Dept: PHYSICAL THERAPY | Facility: CLINIC | Age: 64
End: 2024-12-17
Payer: OTHER MISCELLANEOUS

## 2024-12-17 DIAGNOSIS — M25.511 ACUTE POSTOPERATIVE PAIN OF RIGHT SHOULDER: Primary | ICD-10-CM

## 2024-12-17 DIAGNOSIS — Z98.890 STATUS POST RIGHT ROTATOR CUFF REPAIR: ICD-10-CM

## 2024-12-17 DIAGNOSIS — G89.18 ACUTE POSTOPERATIVE PAIN OF RIGHT SHOULDER: Primary | ICD-10-CM

## 2024-12-17 PROCEDURE — 97140 MANUAL THERAPY 1/> REGIONS: CPT

## 2024-12-17 PROCEDURE — 97110 THERAPEUTIC EXERCISES: CPT

## 2024-12-17 NOTE — PROGRESS NOTES
Physical Therapy Daily Treatment Note  [place] [facility] [address] [place] [address]      Patient: [place] Mondragon   : 1960  Diagnosis/ICD-10 Code:  Acute postoperative pain of right shoulder [G89.18, M25.511]  Referring practitioner: No ref. provider found  Date of Initial Visit: Type: THERAPY  Noted: 2024  Encounter Date:  2024  Patient seen for 3 sessions           Subjective   The patient reported they feel like the pain medication they are using makes them sick. Pt rates their R shoulder pain today as a 7-8/10.     Objective   R shoulder:   Flex: 125  ABD: 95    See Exercise, Manual, and Modality Logs for complete treatment.     Assessment/Plan  Pt educated on surgical restrictions and brace fitting. Patient will continue to benefit from skilled physical therapy to further address their deficits in strength and ROM in order to improve upon their functional mobility and to return to work w/o restrictions.          Timed:  Manual Therapy:    15     mins  15425;  Therapeutic Exercise:    15     mins  37431;     Neuromuscular Wilfredo:   0    mins  74339;    Therapeutic Activity:     0     mins  41556;     Gait Trainin     mins  61888;     Aquatics                         0      mins  71926    Un-timed:  Mechanical Traction      0     mins  16541  Electrical Stimulation:    0     mins  85507 ( );      Timed Treatment:   30   mins   Total Treatment:     30   mins    Fritz Brennan PT, DPT    Electronically signed 2024    [place] License: PT - [id number]

## 2024-12-19 ENCOUNTER — TREATMENT (OUTPATIENT)
Dept: PHYSICAL THERAPY | Facility: CLINIC | Age: 64
End: 2024-12-19
Payer: OTHER MISCELLANEOUS

## 2024-12-19 DIAGNOSIS — Z98.890 STATUS POST RIGHT ROTATOR CUFF REPAIR: ICD-10-CM

## 2024-12-19 DIAGNOSIS — M25.511 ACUTE POSTOPERATIVE PAIN OF RIGHT SHOULDER: Primary | ICD-10-CM

## 2024-12-19 DIAGNOSIS — G89.18 ACUTE POSTOPERATIVE PAIN OF RIGHT SHOULDER: Primary | ICD-10-CM

## 2024-12-19 NOTE — PROGRESS NOTES
Physical Therapy Daily Treatment Note                      [place] [facility] [address] [facility] [place] [address]    Patient: Shannon Mondragon   : 1960  Diagnosis/ICD-10 Code:  Acute postoperative pain of right shoulder [G89.18, M25.511]  Referring practitioner: Buddy Crook MD  Date of Initial Visit: Type: THERAPY  Noted: 2024  Today's Date: 2024  Patient seen for 4 sessions           Subjective   The patient reported that her shoulder feels better since her sling was adjusted last session.    Objective   See Exercise, Manual, and Modality Logs for complete treatment.     Assessment/Plan    The patient demonstrated good tolerance to all Continue to progress with current PT plan of care per patient tolerance.         Timed:  Manual Therapy:    26     mins  02738;  Therapeutic Exercise:    0     mins  91092;     Neuromuscular Wilfredo:   0    mins  79015;    Therapeutic Activity:     0     mins  87958;     Gait Trainin     mins  30929;     Aquatics                         0      mins  32820    Un-timed:  Mechanical Traction      0     mins  45479  Dry Needling     0     mins self-pay  Electrical Stimulation:    0     mins  95845 ( );      Timed Treatment:   26   mins   Total Treatment:     26   mins    Larry Galeano PT  Physical Therapist    Electronically signed 2024    [place] License: PT - [id number]

## 2024-12-23 ENCOUNTER — TREATMENT (OUTPATIENT)
Dept: PHYSICAL THERAPY | Facility: CLINIC | Age: 64
End: 2024-12-23
Payer: OTHER MISCELLANEOUS

## 2024-12-23 DIAGNOSIS — Z98.890 STATUS POST RIGHT ROTATOR CUFF REPAIR: ICD-10-CM

## 2024-12-23 DIAGNOSIS — M25.511 ACUTE POSTOPERATIVE PAIN OF RIGHT SHOULDER: Primary | ICD-10-CM

## 2024-12-23 DIAGNOSIS — Z47.89 AFTERCARE FOLLOWING SURGERY OF THE MUSCULOSKELETAL SYSTEM: Primary | ICD-10-CM

## 2024-12-23 DIAGNOSIS — S46.011A TRAUMATIC COMPLETE TEAR OF RIGHT ROTATOR CUFF, INITIAL ENCOUNTER: ICD-10-CM

## 2024-12-23 DIAGNOSIS — G89.18 ACUTE POSTOPERATIVE PAIN OF RIGHT SHOULDER: Primary | ICD-10-CM

## 2024-12-23 PROCEDURE — 97140 MANUAL THERAPY 1/> REGIONS: CPT | Performed by: PHYSICAL THERAPIST

## 2024-12-23 RX ORDER — HYDROCODONE BITARTRATE AND ACETAMINOPHEN 7.5; 325 MG/1; MG/1
1 TABLET ORAL EVERY 4 HOURS PRN
Qty: 42 TABLET | Refills: 0 | Status: SHIPPED | OUTPATIENT
Start: 2024-12-23

## 2024-12-23 RX ORDER — ONDANSETRON 4 MG/1
8 TABLET, ORALLY DISINTEGRATING ORAL EVERY 8 HOURS PRN
Qty: 20 TABLET | Refills: 0 | Status: SHIPPED | OUTPATIENT
Start: 2024-12-23

## 2024-12-23 NOTE — PROGRESS NOTES
Physical Therapy Daily Treatment Note                      [place] [facility] [address] [place] [address]    Patient: Shannon Mondragon   : 1960  Diagnosis/ICD-10 Code:  Acute postoperative pain of right shoulder [G89.18, M25.511]  Referring practitioner: No ref. provider found  Date of Initial Visit: Type: THERAPY  Noted: 2024  Today's Date: 2024  Patient seen for 5 sessions           Subjective   The patient reported no new complaints today. Her shoulder pain is fairly low.    Objective   See Exercise, Manual, and Modality Logs for complete treatment.     Assessment/Plan    The patient demonstrated good tolerance to manual therapy today. Her shoulder ROM is progressing well. Continue to progress with current PT plan of care per patient tolerance.         Timed:  Manual Therapy:    26     mins  86968;  Therapeutic Exercise:    0     mins  71377;     Neuromuscular Wilfredo:   0    mins  06704;    Therapeutic Activity:     0     mins  70213;     Gait Trainin     mins  19734;     Aquatics                         0      mins  68695    Un-timed:  Mechanical Traction      0     mins  74800  Dry Needling     0     mins self-pay  Electrical Stimulation:    0     mins  15601 ( );      Timed Treatment:   26   mins   Total Treatment:     26   mins    Larry Galeano PT  Physical Therapist    Electronically signed 2024    [place] License: PT - [id number]

## 2024-12-23 NOTE — TELEPHONE ENCOUNTER
PATIENT REQUESTING REFILL ON PAIN MEDICATION AND ZOFRAN. PATIENT REQUESTING TO CHANGE MEDICATION FROM OXYCODONE TO HYDROCODONE. [facility].

## 2024-12-31 ENCOUNTER — TREATMENT (OUTPATIENT)
Dept: PHYSICAL THERAPY | Facility: CLINIC | Age: 64
End: 2024-12-31
Payer: OTHER MISCELLANEOUS

## 2024-12-31 DIAGNOSIS — M25.511 ACUTE POSTOPERATIVE PAIN OF RIGHT SHOULDER: Primary | ICD-10-CM

## 2024-12-31 DIAGNOSIS — G89.18 ACUTE POSTOPERATIVE PAIN OF RIGHT SHOULDER: Primary | ICD-10-CM

## 2024-12-31 DIAGNOSIS — Z98.890 STATUS POST RIGHT ROTATOR CUFF REPAIR: ICD-10-CM

## 2024-12-31 PROCEDURE — 97140 MANUAL THERAPY 1/> REGIONS: CPT

## 2024-12-31 PROCEDURE — 97110 THERAPEUTIC EXERCISES: CPT

## 2024-12-31 NOTE — PROGRESS NOTES
"Physical Therapy Daily Treatment Note  [place] [facility] [address] [place] [address]    Patient: Shannon Mondragon   : 1960  Referring practitioner: No ref. provider found  Date of Initial Visit: Type: THERAPY  Noted: 2024  Today's Date: 2024  Patient seen for 6 sessions           Subjective  Shannon Mondragon reports: her shld isn't hurting bad but she reported her R hand is 8/10. \"It goes from my hand up my forearm into the front of the elbow.\" She questioned if this could be a result of the cardiac cath that went through her wrist. Advised her that we do have folks that experience elbow and hand pain following shld surgery but she would better ask her physician.    Objective   See Exercise, Manual, and Modality Logs for complete treatment.     Assessment/Plan  Encouraged Shannon to sit with sling off at home and to remove it to walk around her home at times. She commented that her hand and shld both felt better after care. Ongoing need for therapist directed intervention required to attain best possible outcome after R RTC repair.    Visit Diagnoses:    ICD-10-CM ICD-9-CM   1. Acute postoperative pain of right shoulder  G89.18 719.41    M25.511 338.18   2. Status post right rotator cuff repair  Z98.890 V45.89       Progress per Plan of Care and Progress strengthening /stabilization /functional activity       Timed:  Manual Therapy:    24     mins  98026;  Therapeutic Exercise:    8     mins  40223;     Neuromuscular Wilfredo:        mins  53564;    Therapeutic Activity:          mins  82993;     Gait Training:           mins  00772;     Ultrasound:          mins  97100;    Electrical Stimulation:         mins  83010 ( );  Aquatics  __   mins   98499    Untimed:  Electrical Stimulation:         mins  63469 ( );  Mechanical Traction:         mins  01985;     Timed Treatment:  32    mins   Total Treatment:     32   mins    Electronically Signed:  Linda Geiger PTA  Physical " Therapist Assistant    [place] [facility] license [id number]

## 2025-01-03 ENCOUNTER — TELEPHONE (OUTPATIENT)
Dept: ORTHOPEDIC SURGERY | Facility: CLINIC | Age: 65
End: 2025-01-03
Payer: COMMERCIAL

## 2025-01-03 ENCOUNTER — TREATMENT (OUTPATIENT)
Dept: PHYSICAL THERAPY | Facility: CLINIC | Age: 65
End: 2025-01-03
Payer: OTHER MISCELLANEOUS

## 2025-01-03 DIAGNOSIS — Z47.89 AFTERCARE FOLLOWING SURGERY OF THE MUSCULOSKELETAL SYSTEM: ICD-10-CM

## 2025-01-03 DIAGNOSIS — G89.18 ACUTE POSTOPERATIVE PAIN OF RIGHT SHOULDER: Primary | ICD-10-CM

## 2025-01-03 DIAGNOSIS — M25.511 ACUTE POSTOPERATIVE PAIN OF RIGHT SHOULDER: Primary | ICD-10-CM

## 2025-01-03 DIAGNOSIS — Z98.890 STATUS POST RIGHT ROTATOR CUFF REPAIR: ICD-10-CM

## 2025-01-03 RX ORDER — HYDROCODONE BITARTRATE AND ACETAMINOPHEN 7.5; 325 MG/1; MG/1
1 TABLET ORAL EVERY 4 HOURS PRN
Qty: 42 TABLET | Refills: 0 | Status: SHIPPED | OUTPATIENT
Start: 2025-01-03

## 2025-01-03 NOTE — TELEPHONE ENCOUNTER
Caller: PAT SIMMONS    Relationship: Emergency Contact    Best call back number: [phone]    Requested Prescriptions:   HYDROcodone-acetaminophen (Norco) 7.5-325 MG per tablet [05735] (Order [id number])     [facility]  Last office visit with prescribing clinician: 11/7/2024   Last telemedicine visit with prescribing clinician: Visit date not found   Next office visit with prescribing clinician: Visit date not found     Additional details provided by patient:     Does the patient have less than a 3 day supply:  [] Yes  [x] No    Would you like a call back once the refill request has been completed: [x] Yes [] No    If the office needs to give you a call back, can they leave a voicemail: [x] Yes [] No    Orlando Solano Rep   01/03/25 14:39 EST

## 2025-01-03 NOTE — PROGRESS NOTES
[facility]                              Physical Therapy Daily Treatment Note    Patient: Shannon Mondragon   : 1960  Diagnosis/ICD-10 Code:  Acute postoperative pain of right shoulder [G89.18, M25.511]  Referring practitioner: No ref. provider found  Date of Initial Visit: Type: THERAPY  Noted: 2024  Today's Date: 1/3/2025  Patient seen for 7 sessions           Subjective   Shannon Mondragon reports: soreness in her shoulder but not having any pain at time of arrival. Pt states she is still having more pain in the palm of her right hand and wrist. PTA explained to patient with how wrist is positioned in sling that when she has the sling off at home, it will be good to try and stretch her wrist. Pt states she is complaint with pain rx, but did not take any prior to therapy today.      Objective     See Exercise, Manual, and Modality Logs for complete treatment.     Assessment/Plan  Shannon  tolerated exercises and manual well, with no complaints of increased pain or discomfort. Patient will continue to benefit from skilled physical therapy services to further address pain management,  their deficits in strength, and range of motion in order to improve upon their functional mobility for any ADL concerns.      Progress per Plan of Care         Timed:  Manual Therapy:    24     mins  28347;  Therapeutic Exercise:    8     mins  58443;     Neuromuscular Wilfredo:        mins  44796;    Therapeutic Activity:          mins  64523;     Gait Training:           mins  58436;        Untimed:  Electrical Stimulation:         mins  29272 ( );  Mechanical Traction:         mins  70559;       Timed Treatment:   32   mins   Total Treatment:     32   mins      Electronically signed:     Rosy Rodriguez PTA  Physical Therapist Assistant  [facility] License #: [id number]

## 2025-01-09 ENCOUNTER — TREATMENT (OUTPATIENT)
Dept: PHYSICAL THERAPY | Facility: CLINIC | Age: 65
End: 2025-01-09
Payer: OTHER MISCELLANEOUS

## 2025-01-09 DIAGNOSIS — G89.18 ACUTE POSTOPERATIVE PAIN OF RIGHT SHOULDER: Primary | ICD-10-CM

## 2025-01-09 DIAGNOSIS — M25.511 ACUTE POSTOPERATIVE PAIN OF RIGHT SHOULDER: Primary | ICD-10-CM

## 2025-01-09 DIAGNOSIS — Z98.890 STATUS POST RIGHT ROTATOR CUFF REPAIR: ICD-10-CM

## 2025-01-09 NOTE — PROGRESS NOTES
Physical Therapy Daily Treatment Note                      Ty[facility]NAN[facility] [address] [place] [address]    Patient: Shannon Mondragon   : 1960  Diagnosis/ICD-10 Code:  Acute postoperative pain of right shoulder [G89.18, M25.511]  Referring practitioner: No ref. provider found  Date of Initial Visit: Type: THERAPY  Noted: 2024  Today's Date: 2025  Patient seen for 8 sessions           Subjective   The patient reported that her right hand continues to go numb and hurts when she is in the sling.     Objective   See Exercise, Manual, and Modality Logs for complete treatment.     Assessment/Plan    Today I adjusted Shannon's sling and added supine flexion aarom to her home exercise program. Her shoulder is more stiff that desired at this time, but should progress with additional home exercises and continued manual therapy in the clinic.         Timed:  Manual Therapy:    12     mins  16428;  Therapeutic Exercise:    10     mins  89566;     Neuromuscular Wilfredo:   0    mins  94631;    Therapeutic Activity:     10     mins  26269;     Gait Trainin     mins  70512;     Aquatics                         0      mins  51435    Un-timed:  Mechanical Traction      0     mins  31233  Dry Needling     0     mins self-pay  Electrical Stimulation:    0     mins  83335 ( );      Timed Treatment:   32   mins   Total Treatment:     32   mins    Larry Galeano PT  Physical Therapist    Electronically signed 2025    [place] License: PT - [id number]

## 2025-01-13 ENCOUNTER — OFFICE VISIT (OUTPATIENT)
Dept: ORTHOPEDIC SURGERY | Facility: CLINIC | Age: 65
End: 2025-01-13
Payer: OTHER MISCELLANEOUS

## 2025-01-13 VITALS
DIASTOLIC BLOOD PRESSURE: 85 MMHG | HEART RATE: 54 BPM | OXYGEN SATURATION: 90 % | BODY MASS INDEX: 22.63 KG/M2 | HEIGHT: 62 IN | SYSTOLIC BLOOD PRESSURE: 126 MMHG | WEIGHT: 123 LBS

## 2025-01-13 DIAGNOSIS — M79.641 RIGHT HAND PAIN: ICD-10-CM

## 2025-01-13 DIAGNOSIS — Z47.89 AFTERCARE FOLLOWING SURGERY OF THE MUSCULOSKELETAL SYSTEM: Primary | ICD-10-CM

## 2025-01-13 PROCEDURE — 99024 POSTOP FOLLOW-UP VISIT: CPT | Performed by: PHYSICIAN ASSISTANT

## 2025-01-13 RX ORDER — ONDANSETRON 4 MG/1
4 TABLET, ORALLY DISINTEGRATING ORAL EVERY 12 HOURS PRN
Qty: 10 TABLET | Refills: 0 | Status: SHIPPED | OUTPATIENT
Start: 2025-01-13

## 2025-01-13 NOTE — PROGRESS NOTES
"Chief Complaint  Follow-up of the Right Shoulder    Subjective          History of Present Illness      Shannon Mondragon is a 64 y.o. female  presents to [facility] for     Patient presents for follow-up evaluation of right shoulder arthroscopic subacromial decompression, mini open rotator cuff repair, 12/2/2024.  She presents in her sling she states she was compliant with sling use.  She feels she is ready to be done with it.  She remains off of work this is a Workmen's Comp. injury she is attending therapy at [facility].  She states she has felt like her hand has been swelling with some stiffness.  She states her incisions healed well and denies redness drainage or dehiscence.  She denies numbness and tingling.  She states she just got a pain medication refill.  She states that the pain medicine does make her nauseous and she is requesting some nausea medicine.      Allergies   Allergen Reactions    Oxycodone Nausea Only        Social History     Socioeconomic History    Marital status:    Tobacco Use    Smoking status: Former     Types: Cigarettes     Passive exposure: Never    Smokeless tobacco: Never    Tobacco comments:     Quit 17 years ago   Vaping Use    Vaping status: Never Used   Substance and Sexual Activity    Alcohol use: Not Currently    Drug use: Never    Sexual activity: Defer        REVIEW OF SYSTEMS    Constitutional: Awake alert and oriented x3, no acute distress, denies fevers, chills, weight loss  Respiratory: No respiratory distress  Vascular: Brisk cap refill, Intact distal pulses, No cyanosis, compartments soft with no signs or symptoms of compartment syndrome or DVT.   Cardiovascular: Denies chest pain, shortness of breath  Skin: Denies rashes, acute skin changes  Neurologic: Denies headache, loss of consciousness  MSK: Right shoulder pain      Objective   Vital Signs:   /85   Pulse 54   Ht 157.5 cm (62.01\")   Wt 55.8 kg (123 lb)   SpO2 " 90%   BMI 22.49 kg/m²     Body mass index is 22.49 kg/m².    Physical Exam       Right shoulder: Incisions are healing well, no erythema, no ecchymosis or swelling, no drainage or signs of infection, active forward elevation 90 passive forward elevation 130 active abduction 80 passive abduction 100 external rotation with abduction 70 internal rotation to her buttock, elbow wrist motion intact she has some pain to the hand with flexion of her fingers and extension of her fingers with some stiffness with active movement but passive movement she has full range of motion of fingers and thumb.    Procedures    Imaging Results (Most Recent)       None             Result Review :   The following data was reviewed by: FATOU Fairbanks on 01/13/2025:               Assessment and Plan    Diagnoses and all orders for this visit:    1. Aftercare following surgery of RIGHT SHOULDER ARTHROSCOPY WITH MINI OPEN ROTATOR CUFF REPAIR, SUBCROMIAL DECOMPRESSION  12/2/24 (Primary)  -     Ambulatory Referral to Physical Therapy for Evaluation & Treatment    2. Right hand pain  -     Ambulatory Referral to Occupational Therapy for Evaluation & Treatment    Other orders  -     ondansetron ODT (ZOFRAN-ODT) 4 MG disintegrating tablet; Take 1 tablet by mouth Every 12 (Twelve) Hours As Needed for Nausea or Vomiting for up to 10 doses.  Dispense: 10 tablet; Refill: 0        Discussed diagnosis and treatment options with the patient she was advised I will order occupational therapy for her hand and wrist for further evaluation and treatment.  She agreed.  She was also given order to continue therapy for the shoulder, remain off of work, she will call for pain medicine refills.  She was given Zofran prescription to take for her upset stomach, follow-up in 6 weeks    Call or return if worsening symptoms.    Follow Up   Return in about 6 weeks (around 2/24/2025) for Recheck.  Patient was given instructions and counseling regarding her  condition or for health maintenance advice. Please see specific information pulled into the AVS if appropriate.       EMR Dragon/Transcription disclaimer:  Part of this note may be an electronic transcription/translation of spoken language to printed text using the Dragon Dictation System

## 2025-01-14 DIAGNOSIS — Z47.89 AFTERCARE FOLLOWING SURGERY OF THE MUSCULOSKELETAL SYSTEM: ICD-10-CM

## 2025-01-14 RX ORDER — HYDROCODONE BITARTRATE AND ACETAMINOPHEN 7.5; 325 MG/1; MG/1
1 TABLET ORAL EVERY 4 HOURS PRN
Qty: 42 TABLET | Refills: 0 | Status: SHIPPED | OUTPATIENT
Start: 2025-01-14

## 2025-01-14 NOTE — TELEPHONE ENCOUNTER
Caller: PAT SIMMONS     Relationship: DAUGHTER     Best call back number: [phone] (home)       Requested Prescriptions:   Requested Prescriptions     Pending Prescriptions Disp Refills    HYDROcodone-acetaminophen (Norco) 7.5-325 MG per tablet 42 tablet 0     Sig: Take 1 tablet by mouth Every 4 (Four) Hours As Needed for Moderate Pain.        Pharmacy where request should be sent: [facility] #[id number] [place] [facility] [address] [phone] [phone] FX     Last office visit with prescribing clinician: 11/7/2024   Last telemedicine visit with prescribing clinician: Visit date not found   Next office visit with prescribing clinician: Visit date not found       Does the patient have less than a 3 day supply:  [x] Yes  [] No    Would you like a call back once the refill request has been completed: [x] Yes [] No    If the office needs to give you a call back, can they leave a voicemail: [x] Yes [] No    Lucero Russo, PCT   01/14/25 11:40 EST

## 2025-01-15 ENCOUNTER — TELEPHONE (OUTPATIENT)
Dept: ORTHOPEDIC SURGERY | Facility: CLINIC | Age: 65
End: 2025-01-15

## 2025-01-15 NOTE — TELEPHONE ENCOUNTER
Caller: LEONARD     Relationship: NURSE     Best call back number: [phone]    What form or medical record are you requesting: WORK STATEMENT FROM 01/13/25    Who is requesting this form or medical record from you: EMPLOYER    How would you like to receive the form or medical records (pick-up, mail, fax): FAX  If fax, what is the fax number: [phone]    Timeframe paperwork needed: ASAP

## 2025-01-16 ENCOUNTER — TREATMENT (OUTPATIENT)
Dept: PHYSICAL THERAPY | Facility: CLINIC | Age: 65
End: 2025-01-16
Payer: OTHER MISCELLANEOUS

## 2025-01-16 DIAGNOSIS — M25.511 ACUTE POSTOPERATIVE PAIN OF RIGHT SHOULDER: Primary | ICD-10-CM

## 2025-01-16 DIAGNOSIS — G89.18 ACUTE POSTOPERATIVE PAIN OF RIGHT SHOULDER: Primary | ICD-10-CM

## 2025-01-16 DIAGNOSIS — Z98.890 STATUS POST RIGHT ROTATOR CUFF REPAIR: ICD-10-CM

## 2025-01-16 NOTE — PROGRESS NOTES
Progress Note  [facility] PT [address] [place] [address]      Patient: Shannon Mondragon   : 1960  Diagnosis/ICD-10 Code:  Acute postoperative pain of right shoulder [G89.18, M25.511]  Referring practitioner: Buddy Crook MD  Date of Initial Visit: Type: THERAPY  Noted: 2024  Today's Date: 2025  Patient seen for 9 sessions      Subjective:   Subjective Questionnaire: QuickDASH:  = 20.45% Disability  Clinical Progress: improved  Home Program Compliance: Yes  Treatment has included: therapeutic exercise, manual therapy, and therapeutic activity    Subjective   The patient reported that her shoulder pain is a 3/10 today. Over the past month, she has noticed improvements in shoulder pain, flexibility, and use. She has discontinued using the sling. She can now use her arm for dressing and light ADLs. She can tell that her shoulder is still stiff. Her right hand is also bothering her quite a bit. She will start OT soon for her hand. She would like to continue with PT at this time.    Objective   Active Range of Motion   Left Shoulder   Flexion: 168 degrees   Extension: 72 degrees   Abduction: 180 degrees   External rotation 90°: 76 degrees   Internal rotation 90°: 63 degrees      Right Shoulder   Flexion: 85 degrees   Abduction: 80 degrees   Functional IR: sacrum  Functional ER: C5       Passive Range of Motion      Right Shoulder   Flexion: 135 degrees with pain  Abduction: 95 degrees wih pain  External rotation 0°: 60 degrees         Strength/Myotome Testing      Left Shoulder      Planes of Motion   Flexion: 4   Extension: 4+   Abduction: 4   External rotation at 0°: 4-   Internal rotation at 0°: 4-      Left Elbow   Flexion: 4  Extension: 4-     Right Shoulder      Planes of Motion   Flexion: 2+  Extension: 4-   Abduction: 2+  External rotation at 0°: 3+   Internal rotation at 0°: 4-      Right Elbow   Flexion: 4  Extension: 4    See Exercise, Manual, and Modality Logs for  complete treatment.     Assessment/Plan  The patient was re-evaluated today and presents with improvements in right shoulder pain, strength, PROM, AROM, and function (QuickDASH) compared to her initial evaluation. Although improved, she continues to present with right shoulder stiffness, weakness, and functional deficits. She would benefit from continued skilled physical therapy to address remaining functional deficits and to allow the patient to return to her prior level of function.     Goals  Plan Goals: STGs: to be met in 6 weeks  1. Patient will be independent with initial HEP - met  2. Patient will report improved (R) shoulder symptoms 2-5/10 for improved tolerance to ADLs - met  3. Patient will report consistently sleeping without waking more than once nightly due to pain for improved restorative healing - progressing  4. Patient will demonstrate improved (R) shoulder PROM flexion 150 deg, abduction 120 deg, and ER 45 deg for improved positional tolerance - progressing  5. Patient will report dressing and bathing independently for improved independence with ADLs - met     LTGs: to be met in 12 weeks  1. Patient will be independent with progressed HEP - progressing  2. Patient will report improved (R) shoulder symptoms 0-2/10 for improved tolerance to functional activities involving (R) UE  - progressing  3. Patient will demonstrate (R) shoulder PROM WNL all planes for improved positional tolerance  - progressing  4. Patient will demonstrate improved (R) shoulder flexion >/= 140 deg and functional ER behind head and IR behind back for improved functional reaching  - progressing  5. Patient will demonstrate improved (R) UE strength grossly >/= 4+/5 for improved functional use of (R) UE  - progressing  6. Patient will have improved QuickDASH score </= 20% for subjective evidence of functional improvement  - progressing    Progress toward previous goals: Partially Met      Recommendations: Continue as  planned  Timeframe: 2 months  Prognosis to achieve goals: good    PT Signature: Larry Galeano, PT    Electronically signed 2025    [place] License: PT - [id number]       Timed:  Manual Therapy:    12     mins  84696;  Therapeutic Exercise:    10     mins  00509;     Neuromuscular Wilfredo:    0    mins  45439;    Therapeutic Activity:     0     mins  57431;     Gait Trainin     mins  28201;     Aquatics                         0      mins  41979    Un-timed:  Mechanical Traction      0     mins  67808  Dry Needling     0     mins self-pay  Electrical Stimulation:    0     mins  88472 ( )  Re-evaluation:               10     mins 92133;    Timed Treatment:   22   mins   Total Treatment:     32   mins

## 2025-01-17 ENCOUNTER — TREATMENT (OUTPATIENT)
Dept: PHYSICAL THERAPY | Facility: CLINIC | Age: 65
End: 2025-01-17
Payer: OTHER MISCELLANEOUS

## 2025-01-17 DIAGNOSIS — M25.511 ACUTE POSTOPERATIVE PAIN OF RIGHT SHOULDER: Primary | ICD-10-CM

## 2025-01-17 DIAGNOSIS — G89.18 ACUTE POSTOPERATIVE PAIN OF RIGHT SHOULDER: Primary | ICD-10-CM

## 2025-01-17 DIAGNOSIS — Z98.890 STATUS POST RIGHT ROTATOR CUFF REPAIR: ICD-10-CM

## 2025-01-17 NOTE — PROGRESS NOTES
"                                           [facility] [place] [facility]                              Physical Therapy Daily Treatment Note    Patient: Shannon Mondragon   : 1960  Diagnosis/ICD-10 Code:  Acute postoperative pain of right shoulder [G89.18, M25.511]  Referring practitioner: Buddy Crook MD  Date of Initial Visit: Type: THERAPY  Noted: 2024  Today's Date: 2025  Patient seen for 10 sessions           Subjective   Shannon Mondragon reports: she was sore after last session, but she knows she did more and that's why. Pt has been able to use her right arm with washing her hair lately and stated \"it felt weird\" since she wasn't used to doing that.      Objective       See Exercise, Manual, and Modality Logs for complete treatment.     Assessment/Plan  Shannon progressing as evident by decreased overall right shoulder pain. Pt tolerated exercises and manual well, with no complaints of increased pain or discomfort. Patient will continue to benefit from skilled physical therapy services to further address pain management,  their deficits in strength, and range of motion in order to improve upon their functional mobility for any ADL concerns.      Progress per Plan of Care         Timed:  Manual Therapy:    18     mins  61988;  Therapeutic Exercise:    8     mins  34865;     Neuromuscular Wilfredo:    8    mins  00016;    Therapeutic Activity:          mins  24075;     Gait Training:           mins  68691;        Untimed:  Electrical Stimulation:         mins  26534 ( );  Mechanical Traction:         mins  75226;       Timed Treatment:   34   mins   Total Treatment:     34   mins      Electronically signed:     Rosy Rodriguez PTA  Physical Therapist Assistant  [facility] License #: [id number]  "

## 2025-01-20 ENCOUNTER — TREATMENT (OUTPATIENT)
Dept: PHYSICAL THERAPY | Facility: CLINIC | Age: 65
End: 2025-01-20
Payer: OTHER MISCELLANEOUS

## 2025-01-20 DIAGNOSIS — Z98.890 STATUS POST RIGHT ROTATOR CUFF REPAIR: Primary | ICD-10-CM

## 2025-01-20 DIAGNOSIS — M25.511 ACUTE POSTOPERATIVE PAIN OF RIGHT SHOULDER: ICD-10-CM

## 2025-01-20 DIAGNOSIS — G89.18 ACUTE POSTOPERATIVE PAIN OF RIGHT SHOULDER: ICD-10-CM

## 2025-01-20 PROCEDURE — 97530 THERAPEUTIC ACTIVITIES: CPT | Performed by: PHYSICAL THERAPIST

## 2025-01-20 PROCEDURE — 97140 MANUAL THERAPY 1/> REGIONS: CPT | Performed by: PHYSICAL THERAPIST

## 2025-01-20 PROCEDURE — 97110 THERAPEUTIC EXERCISES: CPT | Performed by: PHYSICAL THERAPIST

## 2025-01-20 NOTE — PROGRESS NOTES
Physical Therapy Daily Treatment Note                      Tyrel HERNANDEZ [address] [place] [address]    Patient: Shannon Mondragon   : 1960  Diagnosis/ICD-10 Code:  Status post right rotator cuff repair [Z98.890]  Referring practitioner: Buddy Crook MD  Date of Initial Visit: Type: THERAPY  Noted: 2024  Today's Date: 2025  Patient seen for 11 sessions           Subjective   The patient reported that her right wrist is bothering her more than her shoulder. It feels like a burning sensation and that it is warm to the touch.     Objective   See Exercise, Manual, and Modality Logs for complete treatment.     Assessment/Plan    The patient demonstrated fair tolerance to exercise today. She is quite limited in exercise performance due to complaints of wrist pain. Additionally, she has difficulty relaxing with manual therapy. Muscle guarding restrictions PROM. Continue to progress per patient tolerance.         Timed:  Manual Therapy:    12     mins  86139;  Therapeutic Exercise:    10     mins  54539;     Neuromuscular Wilfredo:   0    mins  61556;    Therapeutic Activity:     8     mins  75779;     Gait Trainin     mins  04984;     Aquatics                         0      mins  81384    Un-timed:  Mechanical Traction      0     mins  63488  Dry Needling     0     mins self-pay  Electrical Stimulation:    0     mins  38638 ( );      Timed Treatment:   30   mins   Total Treatment:     30   mins    Larry Galeano PT  Physical Therapist    Electronically signed 2025    [place] License: PT - [id number]

## 2025-01-21 ENCOUNTER — TELEPHONE (OUTPATIENT)
Dept: ORTHOPEDIC SURGERY | Facility: CLINIC | Age: 65
End: 2025-01-21

## 2025-01-21 NOTE — TELEPHONE ENCOUNTER
Provider: RAVEN GUZMÁN    Caller: CLARA    Relationship to Patient: [employer]     Phone Number: [phone]    Reason for Call: WOULD LIKE TO CONFIRM PATIENT HAD SURGERY ON HER RIGHT SHOULDER AND HAS BEEN RELEASED. I DID CONFIRM THAT PATIENT WAS DOING PHYSICAL THERAPY AND HE WOULD LIKE TO CONFIRM THE FACILITY SHE IS GOING TO. PLEASE CALL BACK

## 2025-01-22 ENCOUNTER — TELEPHONE (OUTPATIENT)
Dept: ORTHOPEDIC SURGERY | Facility: CLINIC | Age: 65
End: 2025-01-22
Payer: COMMERCIAL

## 2025-01-22 DIAGNOSIS — Z47.89 AFTERCARE FOLLOWING SURGERY OF THE MUSCULOSKELETAL SYSTEM: ICD-10-CM

## 2025-01-22 RX ORDER — HYDROCODONE BITARTRATE AND ACETAMINOPHEN 7.5; 325 MG/1; MG/1
1 TABLET ORAL EVERY 6 HOURS PRN
Qty: 28 TABLET | Refills: 0 | Status: SHIPPED | OUTPATIENT
Start: 2025-01-22 | End: 2025-01-27 | Stop reason: SDUPTHER

## 2025-01-27 DIAGNOSIS — Z47.89 AFTERCARE FOLLOWING SURGERY OF THE MUSCULOSKELETAL SYSTEM: ICD-10-CM

## 2025-01-27 RX ORDER — HYDROCODONE BITARTRATE AND ACETAMINOPHEN 7.5; 325 MG/1; MG/1
1 TABLET ORAL EVERY 6 HOURS PRN
Qty: 28 TABLET | Refills: 0 | Status: SHIPPED | OUTPATIENT
Start: 2025-01-27

## 2025-01-29 ENCOUNTER — TREATMENT (OUTPATIENT)
Dept: PHYSICAL THERAPY | Facility: CLINIC | Age: 65
End: 2025-01-29
Payer: OTHER MISCELLANEOUS

## 2025-01-29 DIAGNOSIS — G89.18 ACUTE POSTOPERATIVE PAIN OF RIGHT SHOULDER: Primary | ICD-10-CM

## 2025-01-29 DIAGNOSIS — Z98.890 STATUS POST RIGHT ROTATOR CUFF REPAIR: ICD-10-CM

## 2025-01-29 DIAGNOSIS — M25.511 ACUTE POSTOPERATIVE PAIN OF RIGHT SHOULDER: Primary | ICD-10-CM

## 2025-01-29 NOTE — PROGRESS NOTES
Physical Therapy Daily Treatment Note                      Ty[facility] PT [address] [place] [address]    Patient: Shannon Mondragon   : 1960  Diagnosis/ICD-10 Code:  Acute postoperative pain of right shoulder [G89.18, M25.511]  Referring practitioner: Buddy Crook MD  Date of Initial Visit: Type: THERAPY  Noted: 2024  Today's Date: 2025  Patient seen for 12 sessions           Subjective   The patient reported that her shoulder pain is a 5/10 today. Her hand is feeling better. She can finally  items without intense pain in her hand.    Objective   See Exercise, Manual, and Modality Logs for complete treatment.     Assessment/Plan    The patient demonstrated improved right shoulder passive and active ROM today compared to her previous session. She was able to tolerate progressed exercise today due to decreased wrist/hand pain. Continue to progress with current PT plan of care per patient tolerance.         Timed:  Manual Therapy:    12     mins  95386;  Therapeutic Exercise:    10     mins  77188;     Neuromuscular Wilfredo:   0    mins  92756;    Therapeutic Activity:     8     mins  30752;     Gait Trainin     mins  27809;     Aquatics                         0      mins  23789    Un-timed:  Mechanical Traction      0     mins  72922  Dry Needling     0     mins self-pay  Electrical Stimulation:    0     mins  76328 ( );      Timed Treatment:   30   mins   Total Treatment:     30   mins    Larry Galeano PT  Physical Therapist    Electronically signed 2025    [place] License: PT - [id number]

## 2025-02-03 ENCOUNTER — TREATMENT (OUTPATIENT)
Dept: PHYSICAL THERAPY | Facility: CLINIC | Age: 65
End: 2025-02-03
Payer: OTHER MISCELLANEOUS

## 2025-02-03 DIAGNOSIS — G89.18 ACUTE POSTOPERATIVE PAIN OF RIGHT SHOULDER: ICD-10-CM

## 2025-02-03 DIAGNOSIS — Z98.890 STATUS POST RIGHT ROTATOR CUFF REPAIR: Primary | ICD-10-CM

## 2025-02-03 DIAGNOSIS — M25.511 ACUTE POSTOPERATIVE PAIN OF RIGHT SHOULDER: ICD-10-CM

## 2025-02-03 PROCEDURE — 97530 THERAPEUTIC ACTIVITIES: CPT | Performed by: PHYSICAL THERAPIST

## 2025-02-03 PROCEDURE — 97140 MANUAL THERAPY 1/> REGIONS: CPT | Performed by: PHYSICAL THERAPIST

## 2025-02-03 PROCEDURE — 97110 THERAPEUTIC EXERCISES: CPT | Performed by: PHYSICAL THERAPIST

## 2025-02-03 NOTE — PROGRESS NOTES
Physical Therapy Daily Treatment Note                      Ty[facility] PT [address] [place] [address]    Patient: Shannon Mondragon   : 1960  Diagnosis/ICD-10 Code:  Status post right rotator cuff repair [Z98.890]  Referring practitioner: Buddy Crook MD  Date of Initial Visit: Type: THERAPY  Noted: 2024  Today's Date: 2/3/2025  Patient seen for 13 sessions           Subjective   The patient reported that her shoulder pain is a 6/10 today. She has been working on her HEP consistently. She can tell that she can do a little more with her shoulder than she could a few weeks ago.    Objective   See Exercise, Manual, and Modality Logs for complete treatment.     Assessment/Plan    The patient continues to demonstrate right shoulder stiffness in all planes of motion. Although stiff, her ROM continues to improve gradually. Continue to progress with current PT plan of care per patient tolerance.         Timed:  Manual Therapy:    12     mins  60740;  Therapeutic Exercise:    10     mins  46046;     Neuromuscular Wilfredo:   0    mins  72637;    Therapeutic Activity:     8     mins  27641;     Gait Trainin     mins  02269;     Aquatics                         0      mins  13932    Un-timed:  Mechanical Traction      0     mins  12890  Dry Needling     0     mins self-pay  Electrical Stimulation:    0     mins  31065 ( );      Timed Treatment:   30   mins   Total Treatment:     30   mins    Larry Galeano PT  Physical Therapist    Electronically signed 2/3/2025    [place] License: PT - [id number]

## 2025-02-04 ENCOUNTER — TELEPHONE (OUTPATIENT)
Dept: ORTHOPEDIC SURGERY | Facility: CLINIC | Age: 65
End: 2025-02-04
Payer: COMMERCIAL

## 2025-02-04 DIAGNOSIS — Z47.89 AFTERCARE FOLLOWING SURGERY OF THE MUSCULOSKELETAL SYSTEM: ICD-10-CM

## 2025-02-04 RX ORDER — HYDROCODONE BITARTRATE AND ACETAMINOPHEN 7.5; 325 MG/1; MG/1
1 TABLET ORAL EVERY 6 HOURS PRN
Qty: 28 TABLET | Refills: 0 | Status: SHIPPED | OUTPATIENT
Start: 2025-02-04 | End: 2025-02-10 | Stop reason: SDUPTHER

## 2025-02-04 NOTE — TELEPHONE ENCOUNTER
Critical lab received from [facility]. Lactic acid 4.3 Readback and verified with [facility]. Dr. Rebolledo made aware in person with verbalized acknowledgement.    PATIENT REQUESTING PAIN MEDICATION REFILL.   [facility] [place].

## 2025-02-05 ENCOUNTER — TELEPHONE (OUTPATIENT)
Dept: ORTHOPEDICS | Facility: OTHER | Age: 65
End: 2025-02-05
Payer: COMMERCIAL

## 2025-02-09 DIAGNOSIS — Z47.89 ENCOUNTER FOR OTHER ORTHOPEDIC AFTERCARE: ICD-10-CM

## 2025-02-10 ENCOUNTER — TREATMENT (OUTPATIENT)
Dept: PHYSICAL THERAPY | Facility: CLINIC | Age: 65
End: 2025-02-10
Payer: OTHER MISCELLANEOUS

## 2025-02-10 ENCOUNTER — TELEPHONE (OUTPATIENT)
Dept: ORTHOPEDIC SURGERY | Facility: CLINIC | Age: 65
End: 2025-02-10
Payer: COMMERCIAL

## 2025-02-10 DIAGNOSIS — Z47.89 AFTERCARE FOLLOWING SURGERY OF THE MUSCULOSKELETAL SYSTEM: ICD-10-CM

## 2025-02-10 DIAGNOSIS — M25.511 ACUTE POSTOPERATIVE PAIN OF RIGHT SHOULDER: ICD-10-CM

## 2025-02-10 DIAGNOSIS — Z98.890 STATUS POST RIGHT ROTATOR CUFF REPAIR: Primary | ICD-10-CM

## 2025-02-10 DIAGNOSIS — G89.18 ACUTE POSTOPERATIVE PAIN OF RIGHT SHOULDER: ICD-10-CM

## 2025-02-10 PROCEDURE — 97110 THERAPEUTIC EXERCISES: CPT | Performed by: PHYSICAL THERAPIST

## 2025-02-10 PROCEDURE — 97140 MANUAL THERAPY 1/> REGIONS: CPT | Performed by: PHYSICAL THERAPIST

## 2025-02-10 PROCEDURE — 97530 THERAPEUTIC ACTIVITIES: CPT | Performed by: PHYSICAL THERAPIST

## 2025-02-10 RX ORDER — ONDANSETRON 4 MG/1
4 TABLET, ORALLY DISINTEGRATING ORAL EVERY 12 HOURS PRN
Qty: 10 TABLET | Refills: 0 | Status: SHIPPED | OUTPATIENT
Start: 2025-02-10

## 2025-02-10 RX ORDER — HYDROCODONE BITARTRATE AND ACETAMINOPHEN 7.5; 325 MG/1; MG/1
1 TABLET ORAL EVERY 6 HOURS PRN
Qty: 28 TABLET | Refills: 0 | Status: SHIPPED | OUTPATIENT
Start: 2025-02-10

## 2025-02-10 RX ORDER — ONDANSETRON 4 MG/1
TABLET, ORALLY DISINTEGRATING ORAL
Qty: 20 TABLET | OUTPATIENT
Start: 2025-02-10

## 2025-02-10 NOTE — TELEPHONE ENCOUNTER
Caller: PAT SIMMONS    Relationship: Emergency Contact    Best call back number: [phone]    Requested Prescriptions:     HYDROcodone-acetaminophen (Norco) 7.5-325 MG per tablet     Requested Prescriptions      No prescriptions requested or ordered in this encounter        Pharmacy where request should be sent:  [facility]    Last office visit with prescribing clinician: 11/7/2024   Last telemedicine visit with prescribing clinician: Visit date not found   Next office visit with prescribing clinician: Visit date not found     Additional details provided by patient:     Does the patient have less than a 3 day supply:  [x] Yes  [] No    Would you like a call back once the refill request has been completed: [] Yes [] No    If the office needs to give you a call back, can they leave a voicemail: [] Yes [] No    Orlando Burhnam Rep   02/10/25 12:18 EST

## 2025-02-10 NOTE — PROGRESS NOTES
[facility]                              Physical Therapy Daily Treatment Note    Patient: Shannon Mondragon   : 1960  Diagnosis/ICD-10 Code:  Status post right rotator cuff repair [Z98.890]  Referring practitioner: Buddy Crook MD  Date of Initial Visit: Type: THERAPY  Noted: 2024  Today's Date: 2/10/2025  Patient seen for 14 sessions           Subjective   Shannon Mondragon reports: her shoulder has been feeling good, her bicep has been sore. Pt states on Friday she was able to put her bra on by herself and was very happy about that.      Objective     See Exercise, Manual, and Modality Logs for complete treatment.     Assessment/Plan  Shannon progressing as evident by decreased overall right  shoulder pain. Pt tolerated exercises and manual well, with no complaints of increased pain or discomfort. Patient will continue to benefit from skilled physical therapy services to further address pain management,  their deficits in strength, and range of motion in order to improve upon their functional mobility for any ADL concerns.      Progress per Plan of Care         Timed:  Manual Therapy:    8     mins  42024;  Therapeutic Exercise:    10     mins  95230;     Neuromuscular Wilfredo:        mins  20783;    Therapeutic Activity:     8     mins  57341;     Gait Training:           mins  92873;        Untimed:  Electrical Stimulation:         mins  15219 ( );  Mechanical Traction:         mins  54337;       Timed Treatment:   26   mins   Total Treatment:     26   mins      Electronically signed:     Rosy Rodriguez PTA  Physical Therapist Assistant  [facility] License #: [id number]

## 2025-02-12 ENCOUNTER — TREATMENT (OUTPATIENT)
Dept: PHYSICAL THERAPY | Facility: CLINIC | Age: 65
End: 2025-02-12
Payer: OTHER MISCELLANEOUS

## 2025-02-12 DIAGNOSIS — G89.18 ACUTE POSTOPERATIVE PAIN OF RIGHT SHOULDER: Primary | ICD-10-CM

## 2025-02-12 DIAGNOSIS — M25.511 ACUTE POSTOPERATIVE PAIN OF RIGHT SHOULDER: Primary | ICD-10-CM

## 2025-02-12 DIAGNOSIS — Z98.890 STATUS POST RIGHT ROTATOR CUFF REPAIR: ICD-10-CM

## 2025-02-12 PROCEDURE — 97110 THERAPEUTIC EXERCISES: CPT

## 2025-02-12 PROCEDURE — 97112 NEUROMUSCULAR REEDUCATION: CPT

## 2025-02-12 PROCEDURE — 97530 THERAPEUTIC ACTIVITIES: CPT

## 2025-02-12 NOTE — PROGRESS NOTES
Outpatient Physical Therapy  [address] [facility], [place] [address]                            Physical Therapy Daily Treatment Note    Patient: Shannon Mondragon   : 1960  Diagnosis/ICD-10 Code:  Acute postoperative pain of right shoulder [G89.18, M25.511]  Referring practitioner: Buddy Crook MD  Date of Initial Visit: Type: THERAPY  Noted: 2024  Today's Date: 2025  Patient seen for 15 sessions           Subjective   Shannon Mondragon reports: that her shoulder is feeling a little better, although sore/achy today because of the weather. States that if her hand wasn't hurting she would be awesome.  Goes to see Ortho on .     Objective   Discomfort in R hand so exercises modified throughout.    See Exercise, Manual, and Modality Logs for complete treatment.     Assessment/Plan  Shannon progressing as evident by decreased overall R shoulder pain. Pt tolerated exercises well,  limited by pain in R hand . Pt would benefit from skilled PT to address Range of Motion  and Strength deficits, pain management and any concerns with ADLs.       Progress per Plan of Care         Timed:  Manual Therapy:         mins  00008;  Therapeutic Exercise:    10     mins  78320;     Neuromuscular Wilfredo:    10    mins  83484;    Therapeutic Activity:     10     mins  91024;     Gait Training:           mins  21443;      Untimed:  Electrical Stimulation:         mins  68729 ( );  Mechanical Traction:         mins  56260;     Timed Treatment:   30   mins   Total Treatment:     30   mins      Electronically signed:     Gisela Patrick PTA  Physical Therapist Assistant  [facility] License #: [id number]

## 2025-02-17 ENCOUNTER — TREATMENT (OUTPATIENT)
Dept: PHYSICAL THERAPY | Facility: CLINIC | Age: 65
End: 2025-02-17
Payer: OTHER MISCELLANEOUS

## 2025-02-17 DIAGNOSIS — G89.18 ACUTE POSTOPERATIVE PAIN OF RIGHT SHOULDER: ICD-10-CM

## 2025-02-17 DIAGNOSIS — M25.511 ACUTE POSTOPERATIVE PAIN OF RIGHT SHOULDER: ICD-10-CM

## 2025-02-17 DIAGNOSIS — Z98.890 STATUS POST RIGHT ROTATOR CUFF REPAIR: Primary | ICD-10-CM

## 2025-02-17 DIAGNOSIS — Z47.89 AFTERCARE FOLLOWING SURGERY OF THE MUSCULOSKELETAL SYSTEM: ICD-10-CM

## 2025-02-17 PROCEDURE — 97140 MANUAL THERAPY 1/> REGIONS: CPT | Performed by: PHYSICAL THERAPIST

## 2025-02-17 PROCEDURE — 97110 THERAPEUTIC EXERCISES: CPT | Performed by: PHYSICAL THERAPIST

## 2025-02-17 PROCEDURE — 97164 PT RE-EVAL EST PLAN CARE: CPT | Performed by: PHYSICAL THERAPIST

## 2025-02-17 PROCEDURE — 97530 THERAPEUTIC ACTIVITIES: CPT | Performed by: PHYSICAL THERAPIST

## 2025-02-17 NOTE — PROGRESS NOTES
Progress Note  [facility] [address] [place] [address]      Patient: Shannon Mondragon   : 1960  Diagnosis/ICD-10 Code:  Status post right rotator cuff repair [Z98.890]  Referring practitioner: Buddy Crook MD  Date of Initial Visit: Type: THERAPY  Noted: 2024  Today's Date: 2025  Patient seen for 16 sessions      Subjective:   Subjective Questionnaire: QuickDASH:  = 34.09% Disability  Clinical Progress: improved  Home Program Compliance: Yes  Treatment has included: therapeutic exercise, manual therapy, and therapeutic activity    Subjective   The patient reported that her shoulder pain is a 5-6/10 today. Over the past month, she has noticed improvements in shoulder flexibility and strength. She can now reach up behind her head, behind her right hip, and slightly overhead. She does have increased pain with these movements, but her ability to move is better than a month ago. She can tell that her shoulder is still stiff and weak. She would like to continue with PT at this time. She still hasn't been cleared to return to work.     Objective   Active Range of Motion   Left Shoulder   Flexion: 168 degrees   Extension: 72 degrees   Abduction: 180 degrees   External rotation 90°: 76 degrees   Internal rotation 90°: 63 degrees      Right Shoulder   Flexion: 110 degrees   Abduction: 110 degrees   Functional IR: L5  Functional ER: T1        Passive Range of Motion      Right Shoulder   Flexion: 150 degrees with pain  Abduction: 125 degrees wih pain  External rotation 0°: 75 degrees   Internal rotation 0°: 70 degrees         Strength/Myotome Testing      Left Shoulder      Planes of Motion   Flexion: 4   Extension: 4+   Abduction: 4   External rotation at 0°: 4-   Internal rotation at 0°: 4-      Left Elbow   Flexion: 4  Extension: 4-     Right Shoulder      Planes of Motion   Flexion: 3+  Extension: 4  Abduction: 3  External rotation at 0°: 3+   Internal rotation at 0°: 4       Right Elbow   Flexion: 4  Extension: 4    See Exercise, Manual, and Modality Logs for complete treatment.     Assessment/Plan  The patient was re-evaluated today and presents with improvements in right shoulder pain, strength, PROM, AROM, and function (QuickDASH) compared to her previous assessment, Although improved, she continues to present with right shoulder stiffness, weakness, and functional deficits. Her shoulder ROM is still quite limited, but did improve significantly from previous assessment. She was limited for nearly a month in exercise tolerance due to wrist/hand pain. This is still somewhat present, but has improved. She would benefit from continued skilled physical therapy to address remaining functional deficits and to allow the patient to return to her prior level of function.      Goals  Plan Goals: STGs: to be met in 6 weeks  1. Patient will be independent with initial HEP - met  2. Patient will report improved (R) shoulder symptoms 2-5/10 for improved tolerance to ADLs - met  3. Patient will report consistently sleeping without waking more than once nightly due to pain for improved restorative healing - Met  4. Patient will demonstrate improved (R) shoulder PROM flexion 150 deg, abduction 120 deg, and ER 45 deg for improved positional tolerance - Met  5. Patient will report dressing and bathing independently for improved independence with ADLs - met     LTGs: to be met in 12 weeks  1. Patient will be independent with progressed HEP - progressing  2. Patient will report improved (R) shoulder symptoms 0-2/10 for improved tolerance to functional activities involving (R) UE  - progressing  3. Patient will demonstrate (R) shoulder PROM WNL all planes for improved positional tolerance  - progressing  4. Patient will demonstrate improved (R) shoulder flexion >/= 140 deg and functional ER behind head and IR behind back for improved functional reaching  - progressing  5. Patient will demonstrate  improved (R) UE strength grossly >/= 4+/5 for improved functional use of (R) UE  - progressing  6. Patient will have improved QuickDASH score </= 20% for subjective evidence of functional improvement  - progressing    Progress toward previous goals: Partially Met      Recommendations: Continue as planned  Timeframe: 2 months  Prognosis to achieve goals: good    PT Signature: Larry Galeano PT    Electronically signed 2025    [place] License: PT - [id number]       Timed:  Manual Therapy:    8     mins  38377;  Therapeutic Exercise:    12     mins  64581;     Neuromuscular Wilfredo:    0    mins  32267;    Therapeutic Activity:     10     mins  85657;     Gait Trainin     mins  56637;     Aquatics                         0      mins  45498    Un-timed:  Mechanical Traction      0     mins  83375  Dry Needling     0     mins self-pay  Electrical Stimulation:    0     mins  98492 ( )  Re-evaluation:               10     mins 48082;    Timed Treatment:   30   mins   Total Treatment:     40   mins

## 2025-02-18 DIAGNOSIS — Z47.89 ENCOUNTER FOR OTHER ORTHOPEDIC AFTERCARE: ICD-10-CM

## 2025-02-18 DIAGNOSIS — Z47.89 AFTERCARE FOLLOWING SURGERY OF THE MUSCULOSKELETAL SYSTEM: Primary | ICD-10-CM

## 2025-02-18 RX ORDER — HYDROCODONE BITARTRATE AND ACETAMINOPHEN 7.5; 325 MG/1; MG/1
1 TABLET ORAL EVERY 6 HOURS PRN
Qty: 28 TABLET | Refills: 0 | Status: SHIPPED | OUTPATIENT
Start: 2025-02-18

## 2025-02-19 ENCOUNTER — TREATMENT (OUTPATIENT)
Dept: PHYSICAL THERAPY | Facility: CLINIC | Age: 65
End: 2025-02-19
Payer: OTHER MISCELLANEOUS

## 2025-02-19 DIAGNOSIS — G89.18 ACUTE POSTOPERATIVE PAIN OF RIGHT SHOULDER: ICD-10-CM

## 2025-02-19 DIAGNOSIS — Z98.890 STATUS POST RIGHT ROTATOR CUFF REPAIR: Primary | ICD-10-CM

## 2025-02-19 DIAGNOSIS — M25.511 ACUTE POSTOPERATIVE PAIN OF RIGHT SHOULDER: ICD-10-CM

## 2025-02-19 NOTE — PROGRESS NOTES
Physical Therapy Daily Treatment Note                      [place] [facility] [address] [place] [address]    Patient: Shannon Mondragon   : 1960  Diagnosis/ICD-10 Code:  Status post right rotator cuff repair [Z98.890]  Referring practitioner: Buddy Crook MD  Date of Initial Visit: Type: THERAPY  Noted: 2024  Today's Date: 2025  Patient seen for 17 sessions           Subjective   The patient reported that she feels like her shoulder is doing better. She had her wrist evaluated by her doctor and she is being referred to a hand specialist in [place] for treatment.    Objective   See Exercise, Manual, and Modality Logs for complete treatment.     Assessment/Plan    The patient demonstrated good tolerance to light shoulder strengthening exercise. Her passive and AROM are each improving, but are still limited. She would be unable to return to work at this time. Continue to progress with current PT plan of care per patient tolerance.         Timed:  Manual Therapy:    8     mins  57966;  Therapeutic Exercise:    24     mins  43278;     Neuromuscular Wilfredo:   0    mins  95708;    Therapeutic Activity:     8     mins  14062;     Gait Trainin     mins  56617;     Aquatics                         0      mins  31207    Un-timed:  Mechanical Traction      0     mins  69084  Dry Needling     0     mins self-pay  Electrical Stimulation:    0     mins  93894 ( );      Timed Treatment:   40   mins   Total Treatment:     40   mins    Larry Galeano PT  Physical Therapist    Electronically signed 2025    [place] License: PT - [id number]

## 2025-02-24 ENCOUNTER — TREATMENT (OUTPATIENT)
Dept: PHYSICAL THERAPY | Facility: CLINIC | Age: 65
End: 2025-02-24
Payer: OTHER MISCELLANEOUS

## 2025-02-24 ENCOUNTER — TELEPHONE (OUTPATIENT)
Dept: ORTHOPEDIC SURGERY | Facility: CLINIC | Age: 65
End: 2025-02-24
Payer: COMMERCIAL

## 2025-02-24 DIAGNOSIS — Z98.890 STATUS POST RIGHT ROTATOR CUFF REPAIR: Primary | ICD-10-CM

## 2025-02-24 DIAGNOSIS — M25.511 ACUTE POSTOPERATIVE PAIN OF RIGHT SHOULDER: ICD-10-CM

## 2025-02-24 DIAGNOSIS — G89.18 ACUTE POSTOPERATIVE PAIN OF RIGHT SHOULDER: ICD-10-CM

## 2025-02-24 PROCEDURE — 97110 THERAPEUTIC EXERCISES: CPT | Performed by: PHYSICAL THERAPIST

## 2025-02-24 PROCEDURE — 97140 MANUAL THERAPY 1/> REGIONS: CPT | Performed by: PHYSICAL THERAPIST

## 2025-02-24 PROCEDURE — 97530 THERAPEUTIC ACTIVITIES: CPT | Performed by: PHYSICAL THERAPIST

## 2025-02-24 NOTE — PROGRESS NOTES
[facility] Outpatient Physical Therapy                              Physical Therapy Daily Treatment Note    Patient: Shannon Mondragon   : 1960  Diagnosis/ICD-10 Code:  Status post right rotator cuff repair [Z98.890]  Referring practitioner: No ref. provider found  Date of Initial Visit: Type: THERAPY  Noted: 2024  Today's Date: 2025  Patient seen for 18 sessions           Subjective   Shannon Mondragon reports: her shoulder feels good today. Pt states she is now able to mop her floors and sleep on her right side without her shoulder hurting in the mornings.        Objective       See Exercise, Manual, and Modality Logs for complete treatment.     Assessment/Plan  Shannon progressing as evident by decreased overall right shoulder pain. Pt tolerated exercises and manual well, with no complaints of increased pain or discomfort. Patient's ROM improving with passive manual stretching. Patient will continue to benefit from skilled physical therapy services to further address pain management,  their deficits in strength, and range of motion in order to improve upon their functional mobility for any ADL concerns.      Progress per Plan of Care         Timed:  Manual Therapy:    8     mins  85716;  Therapeutic Exercise:    10     mins  17103;     Neuromuscular Wilfredo:        mins  65341;    Therapeutic Activity:     10     mins  83124;     Gait Training:           mins  95690;        Untimed:  Electrical Stimulation:         mins  55425 ( );  Mechanical Traction:         mins  22266;       Timed Treatment:   28   mins   Total Treatment:     28   mins      Electronically signed:     Rosy Rodriguez PTA  Physical Therapist Assistant  [facility] License #: [id number]

## 2025-02-24 NOTE — TELEPHONE ENCOUNTER
Patients daughter dom called requesting pain medication to be refilled. [facility] pharmacy.

## 2025-02-25 DIAGNOSIS — Z47.89 AFTERCARE FOLLOWING SURGERY OF THE MUSCULOSKELETAL SYSTEM: Primary | ICD-10-CM

## 2025-02-25 RX ORDER — TRAMADOL HYDROCHLORIDE 50 MG/1
50 TABLET ORAL EVERY 6 HOURS PRN
Qty: 30 TABLET | Refills: 0 | Status: SHIPPED | OUTPATIENT
Start: 2025-02-25

## 2025-02-25 NOTE — TELEPHONE ENCOUNTER
Called and left a message for the patient informing her that at this point we will only be able to send in Tramadol as she is too far out from surgery. Hub can relay.

## 2025-02-25 NOTE — TELEPHONE ENCOUNTER
Caller: DENICE    Relationship: SELF    Best call back number: [phone]    What is the best time to reach you: ANY    Who are you requesting to speak with (clinical staff, provider,  specific staff member): CLINICAL    What was the call regarding: SHE NEEDS HIM TO FILL HER PAIN MEDS 1 MORE TIME UNTIL SHE CAN BE SEEN BY [facility]  ON 3/11/25- THEY WILL NOT FILL THEM UNTIL SHE IS SEEN- PLEASE CALL

## 2025-03-04 ENCOUNTER — OFFICE VISIT (OUTPATIENT)
Dept: ORTHOPEDIC SURGERY | Facility: CLINIC | Age: 65
End: 2025-03-04
Payer: OTHER MISCELLANEOUS

## 2025-03-04 VITALS
BODY MASS INDEX: 22.63 KG/M2 | HEIGHT: 62 IN | SYSTOLIC BLOOD PRESSURE: 182 MMHG | HEART RATE: 68 BPM | DIASTOLIC BLOOD PRESSURE: 109 MMHG | WEIGHT: 123 LBS | OXYGEN SATURATION: 91 %

## 2025-03-04 DIAGNOSIS — Z47.89 AFTERCARE FOLLOWING SURGERY OF THE MUSCULOSKELETAL SYSTEM: Primary | ICD-10-CM

## 2025-03-04 PROCEDURE — 99024 POSTOP FOLLOW-UP VISIT: CPT | Performed by: PHYSICIAN ASSISTANT

## 2025-03-04 NOTE — PROGRESS NOTES
Chief Complaint  Follow-up of the Right Shoulder    Subjective          History of Present Illness      Shannon Mondragon is a 64 y.o. female  presents to [facility] for     Patient presents for follow-up evaluation of right shoulder arthroscopic subacromial decompression and mini open rotator cuff repair, 12/2/2024.  She is a  and this is a Workmen's Comp. injury.  Patient states her shoulder is getting better she states she is done taking pain medicine from us and we will start her pain management medications.  She states the incisions have healed well and she denies redness drainage or dehiscence.  She states she would like to continue therapy and states she still has some strengthening and range of motion to improve upon but she states that therapy told her that her insurance or work comp case has not been compliant with pain for further visits.  She states she is going to call her .  She would like to remain off of work until she gets further strength and range of motion ability.      Allergies   Allergen Reactions    Oxycodone Nausea Only        Social History     Socioeconomic History    Marital status:    Tobacco Use    Smoking status: Former     Types: Cigarettes     Passive exposure: Never    Smokeless tobacco: Never    Tobacco comments:     Quit 17 years ago   Vaping Use    Vaping status: Never Used   Substance and Sexual Activity    Alcohol use: Not Currently    Drug use: Never    Sexual activity: Defer        REVIEW OF SYSTEMS    Constitutional: Awake alert and oriented x3, no acute distress, denies fevers, chills, weight loss  Respiratory: No respiratory distress  Vascular: Brisk cap refill, Intact distal pulses, No cyanosis, compartments soft with no signs or symptoms of compartment syndrome or DVT.   Cardiovascular: Denies chest pain, shortness of breath  Skin: Denies rashes, acute skin changes  Neurologic: Denies headache, loss of  "consciousness  MSK: Right shoulder pain      Objective   Vital Signs:   BP (!) 182/109 Comment: PROVIDER AWARE. PT ADVISED TO CALL PCP.  Pulse 68   Ht 157.5 cm (62.01\")   Wt 55.8 kg (123 lb)   SpO2 91%   BMI 22.49 kg/m²     Body mass index is 22.49 kg/m².    Physical Exam       Right shoulder: Well-healed incisions, no erythema, no ecchymosis or swelling active forward elevation 170 active abduction 80 external rotation with abduction 85 internal rotation to T12 4+ supraspinatus 5 out of 5 infraspinatus and subscapularis, elbow wrist hand range of motion intact/appropriate, neurovascular intact      Procedures    Imaging Results (Most Recent)       None             Result Review :   The following data was reviewed by: FATOU Fairbanks on 03/04/2025:               Assessment and Plan    Diagnoses and all orders for this visit:    1. Aftercare following surgery of RIGHT SHOULDER ARTHROSCOPY WITH MINI OPEN ROTATOR CUFF REPAIR, SUBCROMIAL DECOMPRESSION  12/2/24 (Primary)  -     Ambulatory Referral to Physical Therapy for Evaluation & Treatment        Discussed diagnosis and treatment options with the patient she was advised to continue therapy for strength and range of motion she was given a new order for this, remain off of work follow-up in 6 weeks for recheck    Call or return if worsening symptoms.    Follow Up   Return in about 6 weeks (around 4/15/2025) for Recheck.  Patient was given instructions and counseling regarding her condition or for health maintenance advice. Please see specific information pulled into the AVS if appropriate.       EMR Dragon/Transcription disclaimer:  Part of this note may be an electronic transcription/translation of spoken language to printed text using the Dragon Dictation System  "

## 2025-03-07 ENCOUNTER — TREATMENT (OUTPATIENT)
Dept: PHYSICAL THERAPY | Facility: CLINIC | Age: 65
End: 2025-03-07
Payer: OTHER MISCELLANEOUS

## 2025-03-07 DIAGNOSIS — Z98.890 STATUS POST RIGHT ROTATOR CUFF REPAIR: ICD-10-CM

## 2025-03-07 DIAGNOSIS — G89.18 ACUTE POSTOPERATIVE PAIN OF RIGHT SHOULDER: Primary | ICD-10-CM

## 2025-03-07 DIAGNOSIS — M25.511 ACUTE POSTOPERATIVE PAIN OF RIGHT SHOULDER: Primary | ICD-10-CM

## 2025-03-07 NOTE — PROGRESS NOTES
Physical Therapy Daily Treatment Note                      [facility] [address] [place] [address]    Patient: Shannon Mondragon   : 1960  Diagnosis/ICD-10 Code:  Acute postoperative pain of right shoulder [G89.18, M25.511]  Referring practitioner: Jean Freedman*  Date of Initial Visit: Type: THERAPY  Noted: 2024  Today's Date: 3/7/2025  Patient seen for 19 sessions           Subjective   The patient reported that she received injections in her neck and her right hand pain is a 0/10 today. She is very happy that her hand pain is improved. She feels like her shoulder is also doing better    Objective   See Exercise, Manual, and Modality Logs for complete treatment.     Assessment/Plan    The patient demonstrated good tolerance to all functional shoulder strengthening exercise and manual therapy. She was able to tolerate more exercise and stretching today than previous sessions. Continue to progress with current PT plan of care per patient tolerance.         Timed:  Manual Therapy:    8     mins  59647;  Therapeutic Exercise:    24     mins  83780;     Neuromuscular Wilfredo:   0    mins  77133;    Therapeutic Activity:     8     mins  83912;     Gait Trainin     mins  54292;     Aquatics                         0      mins  69034    Un-timed:  Mechanical Traction      0     mins  70612  Dry Needling     0     mins self-pay  Electrical Stimulation:    0     mins  75668 ( );      Timed Treatment:   40   mins   Total Treatment:     40   mins    Larry Galeano PT  Physical Therapist    Electronically signed 3/7/2025    [place] License: PT - [id number]

## 2025-03-18 ENCOUNTER — TREATMENT (OUTPATIENT)
Dept: PHYSICAL THERAPY | Facility: CLINIC | Age: 65
End: 2025-03-18
Payer: OTHER MISCELLANEOUS

## 2025-03-18 DIAGNOSIS — M25.511 ACUTE POSTOPERATIVE PAIN OF RIGHT SHOULDER: Primary | ICD-10-CM

## 2025-03-18 DIAGNOSIS — Z98.890 STATUS POST RIGHT ROTATOR CUFF REPAIR: ICD-10-CM

## 2025-03-18 DIAGNOSIS — G89.18 ACUTE POSTOPERATIVE PAIN OF RIGHT SHOULDER: Primary | ICD-10-CM

## 2025-03-18 NOTE — PROGRESS NOTES
Re-Assessment / Re-Certification  [facility] [address] [place] [address]      Patient: Shannon Mondragon   : 1960  Diagnosis/ICD-10 Code:  Acute postoperative pain of right shoulder [G89.18, M25.511]  Referring practitioner: Buddy Crook MD  Date of Initial Visit: Type: THERAPY  Noted: 2024  Today's Date: 3/18/2025  Patient seen for 20 sessions      Subjective:   Subjective Questionnaire:  QuickDASH:  = 15.91% Disability   Clinical Progress: improved  Home Program Compliance: Yes  Treatment has included: therapeutic exercise, neuromuscular re-education, manual therapy, and therapeutic activity    Subjective   The patient reported that her shoulder pain is a 0/10 today at rest. Her pain increases to a 4/10 with exercise/stretching. Over the past month, she can tell that her shoulder is consistently improving. She can reach further overhead and feels stronger. She does still notice that her shoulder gets shaky at times with lifting things. She also has trouble reaching behind her back. She hopes that she will be cleared to return to work after her next ortho visit. She would like to continue with PT until then.     Objective   Active Range of Motion   Left Shoulder   Flexion: 168 degrees   Extension: 72 degrees   Abduction: 180 degrees   External rotation 90°: 76 degrees   Internal rotation 90°: 63 degrees      Right Shoulder   Flexion: 132 degrees   Abduction: 128 degrees   Functional IR: L5  Functional ER: T1        Passive Range of Motion      Right Shoulder   Flexion: 150 degrees with pain  Abduction: 135 degrees wih pain  External rotation 0°: 78 degrees   Internal rotation 0°: 70 degrees         Strength/Myotome Testing      Left Shoulder      Planes of Motion   Flexion: 4   Extension: 4+   Abduction: 4   External rotation at 0°: 4-   Internal rotation at 0°: 4-      Left Elbow   Flexion: 4  Extension: 4-     Right Shoulder      Planes of Motion   Flexion:  3+  Extension: 4  Abduction: 3+  External rotation at 0°: 4-   Internal rotation at 0°: 4      Right Elbow   Flexion: 4  Extension: 4    See Exercise, Manual, and Modality Logs for complete treatment.     Assessment & Plan       Assessment  Impairments: abnormal muscle firing, abnormal muscle tone, abnormal or restricted ROM, activity intolerance, impaired physical strength, lacks appropriate home exercise program, pain with function and safety issue   Functional limitations: carrying objects, lifting, pulling, pushing, reaching behind back, reaching overhead and unable to perform repetitive tasks   Assessment details: The patient was re-evaluated today and presents with improvements in right shoulder pain, strength, ROM, and function (QuickDASH) compared to her previous assessment. Although improved, she continues to present with right shoulder stiffness, weakness, and ongoing functional deficits. Her shoulder is still more stiff than I would like at this point, but gradually improving. She would benefit from continued skilled physical therapy to address remaining functional deficits and to allow the patient to return to her prior level of function.   Prognosis: good    Goals  Plan Goals: Goals  Plan Goals: STGs: to be met in 6 weeks  1. Patient will be independent with initial HEP - met  2. Patient will report improved (R) shoulder symptoms 2-5/10 for improved tolerance to ADLs - met  3. Patient will report consistently sleeping without waking more than once nightly due to pain for improved restorative healing - Met  4. Patient will demonstrate improved (R) shoulder PROM flexion 150 deg, abduction 120 deg, and ER 45 deg for improved positional tolerance - Met  5. Patient will report dressing and bathing independently for improved independence with ADLs - met     LTGs: to be met in 12 weeks  1. Patient will be independent with progressed HEP - progressing  2. Patient will report improved (R) shoulder symptoms  0-2/10 for improved tolerance to functional activities involving (R) UE  - Met  3. Patient will demonstrate (R) shoulder PROM WNL all planes for improved positional tolerance  - progressing  4. Patient will demonstrate improved (R) shoulder flexion >/= 140 deg and functional ER behind head and IR behind back for improved functional reaching  - progressing  5. Patient will demonstrate improved (R) UE strength grossly >/= 4+/5 for improved functional use of (R) UE  - progressing  6. Patient will have improved QuickDASH score </= 20% for subjective evidence of functional improvement  - Met      Plan  Therapy options: will be seen for skilled therapy services  Planned modality interventions: cryotherapy, electrical stimulation/Russian stimulation and TENS  Planned therapy interventions: ADL retraining, soft tissue mobilization, strengthening, stretching, therapeutic activities, joint mobilization, home exercise program, functional ROM exercises, flexibility, body mechanics training, postural training, neuromuscular re-education, manual therapy and motor coordination training  Frequency: 2x week  Duration in weeks: 12  Treatment plan discussed with: patient      Progress toward previous goals: Partially Met      Recommendations: Continue as planned  Timeframe: 1 month  Prognosis to achieve goals: good    PT Signature: Larry Galeano PT    Electronically signed 3/18/2025    [place] License: PT - [id number]       Based upon review of the patient's progress and continued therapy plan, it is my medical opinion that Shannon Mondragon should continue physical therapy treatment at [facility]  [address] [facility]  [place] [address] [phone].    Signature: __________________________________  Buddy Crook MD    Initial Certification  Certification Period: 3/18/2025 thru 6/15/2025  I certify that the therapy services are furnished while this patient is under my care.  The services  outlined above are required by this patient, and will be reviewed every 90 days.     PHYSICIAN: Buddy Crook MD  NPI: [id number]      DATE:     Please sign and return via fax to [phone]. Thank you, [facility].    Timed:  Manual Therapy:    8     mins  31512;  Therapeutic Exercise:    10     mins  62474;     Neuromuscular Wilfredo:    0    mins  38622;    Therapeutic Activity:     10     mins  38233;     Gait Trainin     mins  87099;     Aquatics                         0      mins  76610    Un-timed:  Mechanical Traction      0     mins  51533  Dry Needling     0     mins self-pay  Electrical Stimulation:    0     mins  43156 ( )  Re-evaluation:               8     mins 50321;    Timed Treatment:  28    mins   Total Treatment:    36    mins

## 2025-03-20 ENCOUNTER — TREATMENT (OUTPATIENT)
Dept: PHYSICAL THERAPY | Facility: CLINIC | Age: 65
End: 2025-03-20
Payer: OTHER MISCELLANEOUS

## 2025-03-20 DIAGNOSIS — M25.511 ACUTE POSTOPERATIVE PAIN OF RIGHT SHOULDER: ICD-10-CM

## 2025-03-20 DIAGNOSIS — Z98.890 STATUS POST RIGHT ROTATOR CUFF REPAIR: Primary | ICD-10-CM

## 2025-03-20 DIAGNOSIS — G89.18 ACUTE POSTOPERATIVE PAIN OF RIGHT SHOULDER: ICD-10-CM

## 2025-03-20 NOTE — PROGRESS NOTES
[facility] [place] [facility]                              Physical Therapy Daily Treatment Note    Patient: Shannon Mondragon   : 1960  Diagnosis/ICD-10 Code:  Status post right rotator cuff repair [Z98.890]  Referring practitioner: Buddy Crook MD  Date of Initial Visit: Type: THERAPY  Noted: 2024  Today's Date: 3/20/2025  Patient seen for 21 sessions           Subjective   Shannon Mondragon reports: her shoulder has been feeling pretty good, she has been having more issues and pain in her right hand. Pt states she is hoping she will get to return to work after her next ortho follow up.      Objective     See Exercise, Manual, and Modality Logs for complete treatment.     Assessment/Plan  Shannon progressing as evident by decreased overall right shoulder pain. Pt tolerated exercises and manual well, with no complaints of increased pain or discomfort. Patient will continue to benefit from skilled physical therapy services to further address pain management,  their deficits in strength, and range of motion in order to improve upon their functional mobility for any ADL concerns.      Progress per Plan of Care         Timed:  Manual Therapy:    8     mins  22004;  Therapeutic Exercise:    10     mins  67968;     Neuromuscular Wilfredo:        mins  30442;    Therapeutic Activity:     10     mins  11743;     Gait Training:           mins  83081;        Untimed:  Electrical Stimulation:         mins  97028 ( );  Mechanical Traction:         mins  36776;       Timed Treatment:   28   mins   Total Treatment:     28   mins      Electronically signed:     Rosy Rodriguez PTA  Physical Therapist Assistant  [facility] License #: [id number]

## 2025-03-25 ENCOUNTER — TREATMENT (OUTPATIENT)
Dept: PHYSICAL THERAPY | Facility: CLINIC | Age: 65
End: 2025-03-25
Payer: OTHER MISCELLANEOUS

## 2025-03-25 DIAGNOSIS — M25.511 ACUTE POSTOPERATIVE PAIN OF RIGHT SHOULDER: ICD-10-CM

## 2025-03-25 DIAGNOSIS — Z98.890 STATUS POST RIGHT ROTATOR CUFF REPAIR: Primary | ICD-10-CM

## 2025-03-25 DIAGNOSIS — G89.18 ACUTE POSTOPERATIVE PAIN OF RIGHT SHOULDER: ICD-10-CM

## 2025-03-25 PROCEDURE — 97112 NEUROMUSCULAR REEDUCATION: CPT | Performed by: PHYSICAL THERAPIST

## 2025-03-25 PROCEDURE — 97530 THERAPEUTIC ACTIVITIES: CPT | Performed by: PHYSICAL THERAPIST

## 2025-03-25 PROCEDURE — 97110 THERAPEUTIC EXERCISES: CPT | Performed by: PHYSICAL THERAPIST

## 2025-03-25 NOTE — PROGRESS NOTES
[facility] [place] [facility]                              Physical Therapy Daily Treatment Note    Patient: Shannon Mondragon   : 1960  Diagnosis/ICD-10 Code:  Status post right rotator cuff repair [Z98.890]  Referring practitioner: Buddy Crook MD  Date of Initial Visit: Type: THERAPY  Noted: 2024  Today's Date: 3/25/2025  Patient seen for 22 sessions           Subjective   Shannon Mondragon reports: her shoulder feels pretty good today. Pt reports she didn't want to get stretched today because she doesn't feel like she needs it.       Objective     See Exercise, Manual, and Modality Logs for complete treatment.     Assessment/Plan  Patient tolerated exercises well today. Pt did require multiple visual and verbal cues for shoulder ext to keep her arms straight and to not use her body as momentum to pull the band down. Will continue to progress patient per POC and patient tolerance.    Progress per Plan of Care         Timed:  Manual Therapy:        mins  54347;  Therapeutic Exercise:    10     mins  77074;     Neuromuscular Wilfredo:    8    mins  33760;    Therapeutic Activity:     10     mins  18997;     Gait Training:           mins  17461;        Untimed:  Electrical Stimulation:         mins  70350 ( );  Mechanical Traction:         mins  19638;       Timed Treatment:   28   mins   Total Treatment:     28   mins      Electronically signed:     Rosy Rodriguez PTA  Physical Therapist Assistant  [facility] License #: [id number]

## 2025-03-27 ENCOUNTER — TELEPHONE (OUTPATIENT)
Dept: PHYSICAL THERAPY | Facility: CLINIC | Age: 65
End: 2025-03-27
Payer: COMMERCIAL

## 2025-03-27 NOTE — TELEPHONE ENCOUNTER
Left voicemail for patient as she missed her appt today and to confirm her next appointment on Tuesday 4-1-25. Call back number was left for any questions and to cancel any future appointments if she is unable to make it.

## 2025-04-01 ENCOUNTER — TREATMENT (OUTPATIENT)
Dept: PHYSICAL THERAPY | Facility: CLINIC | Age: 65
End: 2025-04-01
Payer: OTHER MISCELLANEOUS

## 2025-04-01 DIAGNOSIS — Z98.890 STATUS POST RIGHT ROTATOR CUFF REPAIR: Primary | ICD-10-CM

## 2025-04-01 DIAGNOSIS — M25.511 ACUTE POSTOPERATIVE PAIN OF RIGHT SHOULDER: ICD-10-CM

## 2025-04-01 DIAGNOSIS — G89.18 ACUTE POSTOPERATIVE PAIN OF RIGHT SHOULDER: ICD-10-CM

## 2025-04-01 PROCEDURE — 97530 THERAPEUTIC ACTIVITIES: CPT | Performed by: PHYSICAL THERAPIST

## 2025-04-01 PROCEDURE — 97110 THERAPEUTIC EXERCISES: CPT | Performed by: PHYSICAL THERAPIST

## 2025-04-01 PROCEDURE — 97112 NEUROMUSCULAR REEDUCATION: CPT | Performed by: PHYSICAL THERAPIST

## 2025-04-01 NOTE — PROGRESS NOTES
[facility] [place] [facility]                              Physical Therapy Daily Treatment Note    Patient: Shannon Mondragon   : 1960  Diagnosis/ICD-10 Code:  Status post right rotator cuff repair [Z98.890]  Referring practitioner: Buddy Crook MD  Date of Initial Visit: Type: THERAPY  Noted: 2024  Today's Date: 2025  Patient seen for 23 sessions           Subjective   Shannon Mondragon reports: no new complaints at time of therapy, pt states she is ready to get back to work.      Objective     See Exercise, Manual, and Modality Logs for complete treatment.     Assessment/Plan  Patient tolerated progression of strengthening exercises and activities today without complaints of increased pain or discomfort. Will continue to progress patient per tolerance and POC to regain strength in order to return to work.     Progress per Plan of Care         Timed:  Manual Therapy:         mins  58015;  Therapeutic Exercise:    10     mins  20386;     Neuromuscular Wilfredo:    8    mins  11676;    Therapeutic Activity:     10     mins  66216;     Gait Training:           mins  33088;        Untimed:  Electrical Stimulation:         mins  51535 ( );  Mechanical Traction:         mins  16866;       Timed Treatment:   28   mins   Total Treatment:     28   mins      Electronically signed:     Rosy Rodriguez PTA  Physical Therapist Assistant  [place] [facility] License #: [id number]

## 2025-04-03 ENCOUNTER — TELEPHONE (OUTPATIENT)
Dept: PHYSICAL THERAPY | Facility: CLINIC | Age: 65
End: 2025-04-03

## 2025-04-03 NOTE — TELEPHONE ENCOUNTER
Caller: Shannon Mondragon    Relationship: Self    What was the call regarding: NOT FEELING WELL  
no

## 2025-04-09 ENCOUNTER — TREATMENT (OUTPATIENT)
Dept: PHYSICAL THERAPY | Facility: CLINIC | Age: 65
End: 2025-04-09
Payer: COMMERCIAL

## 2025-04-09 DIAGNOSIS — Z98.890 STATUS POST RIGHT ROTATOR CUFF REPAIR: Primary | ICD-10-CM

## 2025-04-09 DIAGNOSIS — G89.18 ACUTE POSTOPERATIVE PAIN OF RIGHT SHOULDER: ICD-10-CM

## 2025-04-09 DIAGNOSIS — M25.511 ACUTE POSTOPERATIVE PAIN OF RIGHT SHOULDER: ICD-10-CM

## 2025-04-09 PROCEDURE — 97112 NEUROMUSCULAR REEDUCATION: CPT

## 2025-04-09 PROCEDURE — 97530 THERAPEUTIC ACTIVITIES: CPT

## 2025-04-09 PROCEDURE — 97110 THERAPEUTIC EXERCISES: CPT

## 2025-04-09 NOTE — PROGRESS NOTES
[facility] [place] [facility]                              Physical Therapy Daily Treatment Note    Patient: Shannon Mondragon   : 1960  Diagnosis/ICD-10 Code:  Status post right rotator cuff repair [Z98.890]  Referring practitioner: Buddy Crook MD  Date of Initial Visit: Type: THERAPY  Noted: 2024  Today's Date: 2025  Patient seen for 24 sessions           Subjective   Shannon Mondragon reports: she is ready to get back to work. Pt denies any pain in her shoulder at time of therapy.      Objective     See Exercise, Manual, and Modality Logs for complete treatment.     Assessment/Plan  Shannon progressing as evident by decreased overall right shoulder pain. Pt tolerated strengthening exercises well, with no complaints of increased pain or discomfort. Patient will continue to benefit from skilled physical therapy services to further address  their deficits in strength, and range of motion in order to improve upon their functional mobility for any ADL concerns.      Progress per Plan of Care         Timed:  Manual Therapy:         mins  91500;  Therapeutic Exercise:    10     mins  98201;     Neuromuscular Wilfredo:    8    mins  84667;    Therapeutic Activity:     10     mins  95051;     Gait Training:           mins  69640;        Untimed:  Electrical Stimulation:         mins  09678 ( );  Mechanical Traction:         mins  10831;       Timed Treatment:   28   mins   Total Treatment:     28   mins      Electronically signed:     Rosy Rodriguez PTA  Physical Therapist Assistant  [facility] License #: [id number]

## 2025-04-14 ENCOUNTER — TELEPHONE (OUTPATIENT)
Dept: PHYSICAL THERAPY | Facility: CLINIC | Age: 65
End: 2025-04-14

## 2025-04-14 NOTE — TELEPHONE ENCOUNTER
Caller: Shannon Mondragon    Relationship: Self    What was the call regarding: ANOTHER APPOINTMENT

## 2025-04-15 ENCOUNTER — OFFICE VISIT (OUTPATIENT)
Dept: ORTHOPEDIC SURGERY | Facility: CLINIC | Age: 65
End: 2025-04-15
Payer: OTHER MISCELLANEOUS

## 2025-04-15 VITALS
SYSTOLIC BLOOD PRESSURE: 144 MMHG | DIASTOLIC BLOOD PRESSURE: 88 MMHG | HEART RATE: 89 BPM | WEIGHT: 123 LBS | BODY MASS INDEX: 22.63 KG/M2 | OXYGEN SATURATION: 90 % | HEIGHT: 62 IN

## 2025-04-15 DIAGNOSIS — Z47.89 AFTERCARE FOLLOWING SURGERY OF THE MUSCULOSKELETAL SYSTEM: Primary | ICD-10-CM

## 2025-04-15 PROCEDURE — 99213 OFFICE O/P EST LOW 20 MIN: CPT | Performed by: PHYSICIAN ASSISTANT

## 2025-04-15 NOTE — PROGRESS NOTES
"Chief Complaint  Follow-up of the Right Shoulder    Subjective          History of Present Illness      Shannon Mondragon is a 64 y.o. female     History of Present Illness  The patient presents for a follow-up of right shoulder arthroscopic subacromial decompression and mini open rotator cuff repair performed on 12/02/2024.    This is a workmen's compensation injury. She reports ongoing improvement in her condition. She was discharged from physical therapy earlier this week. Although she has been abstaining from work, she expresses confidence in her strength, mobility, and overall health to resume her duties. She does not experience any issues with range of motion or strength and is not experiencing any pain.        Allergies   Allergen Reactions    Oxycodone Nausea Only        Social History     Socioeconomic History    Marital status:    Tobacco Use    Smoking status: Former     Types: Cigarettes     Passive exposure: Never    Smokeless tobacco: Never    Tobacco comments:     Quit 17 years ago   Vaping Use    Vaping status: Never Used   Substance and Sexual Activity    Alcohol use: Not Currently    Drug use: Never    Sexual activity: Defer        REVIEW OF SYSTEMS    Constitutional: Awake alert and oriented x3, no acute distress, denies fevers, chills, weight loss  Respiratory: No respiratory distress  Vascular: Brisk cap refill, Intact distal pulses, No cyanosis, compartments soft with no signs or symptoms of compartment syndrome or DVT.   Cardiovascular: Denies chest pain, shortness of breath  Skin: Denies rashes, acute skin changes  Neurologic: Denies headache, loss of consciousness  MSK: Right shoulder pain      Objective   Vital Signs:   /88   Pulse 89   Ht 157.5 cm (62.01\")   Wt 55.8 kg (123 lb)   SpO2 90%   BMI 22.49 kg/m²     Body mass index is 22.49 kg/m².         Physical Exam  The right shoulder is nontender to palpation, no pain with range of motion, 5 out of 5 rotator cuff strength. " Active forward elevation is 170, active abduction is 120. External rotation with abduction is 80, internal rotation to T12. Neurovascularly intact. Elbow, wrist, hand range of motion is intact/appropriate.        Procedures    Imaging Results (Most Recent)       None                     Assessment and Plan    Diagnoses and all orders for this visit:    1. Aftercare following surgery of RIGHT SHOULDER ARTHROSCOPY WITH MINI OPEN ROTATOR CUFF REPAIR, SUBCROMIAL DECOMPRESSION  12/2/24 (Primary)        Assessment & Plan  1. Postoperative status following right shoulder arthroscopic subacromial decompression and mini open rotator cuff repair.  The diagnosis was reviewed with the patient. She reports continued improvement and has been released from physical therapy. She feels strong enough and mobile enough to return to work. Physical examination shows no tenderness, pain with range of motion, and full rotator cuff strength. Active forward elevation is 170 degrees, active abduction is 120 degrees, external rotation with abduction is 80 degrees, and internal rotation is to T12. Neurovascular status is intact, and elbow, wrist, and hand range of motion are appropriate. The treatment plan discussed includes continuing home exercises. She will return to work full duty with no restrictions per her request.    Follow-up  The patient will follow up as needed per her request.    PROCEDURE  The patient underwent right shoulder arthroscopic subacromial decompression and mini open rotator cuff repair on 12/02/2024.      Call or return if worsening symptoms.    Follow Up   Return if symptoms worsen or fail to improve.  Patient was given instructions and counseling regarding her condition or for health maintenance advice. Please see specific information pulled into the AVS if appropriate.       Contains text text generated by [email or web address] Dragon/Transcription disclaimer:  Part of this note may be an electronic  transcription/translation of spoken language to printed text using the Dragon Dictation System

## (undated) DEVICE — NDL HVY/DUTY SCORPION W/MEGALOADER REUS

## (undated) DEVICE — STERILE POLYISOPRENE POWDER-FREE SURGICAL GLOVES WITH EMOLLIENT COATING: Brand: PROTEXIS

## (undated) DEVICE — STRIP,CLOSURE,WOUND,MEDI-STRIP,1/2X4: Brand: MEDLINE

## (undated) DEVICE — SLV DISTRACTION STAR VELCRO XL DISP

## (undated) DEVICE — SUT PROLN 0 CT1 30IN 8424H

## (undated) DEVICE — SLV SCD KN/LEN ADJ EXPRSS BLENDED MD 1P/U

## (undated) DEVICE — GLIDESHEATH SLENDER STAINLESS STEEL KIT: Brand: GLIDESHEATH SLENDER

## (undated) DEVICE — GLV SURG SENSICARE PI ORTHO SZ8 LF STRL

## (undated) DEVICE — TBG INFLOW/OUTFLOW DUALWAVE 1P/U

## (undated) DEVICE — BLD CUT FORMLA AGGR PLS 5.0MM

## (undated) DEVICE — CATH DIAG IMPULSE FR4 5F 100CM

## (undated) DEVICE — CATH DIAG IMPULSE FL3.5 5F 100CM

## (undated) DEVICE — SHOULDER P.A.D. III: Brand: DEROYAL

## (undated) DEVICE — SOL IRR NACL 0.9PCT 3000ML

## (undated) DEVICE — CANN TWST IN TRPL DAM 7CM 8.25MM

## (undated) DEVICE — DRESSING,GAUZE,XEROFORM,CURAD,1"X8",ST: Brand: CURAD

## (undated) DEVICE — ADHS LIQ MASTISOL 2/3ML

## (undated) DEVICE — DGW .035 FC J3MM 260CM TEF: Brand: EMERALD

## (undated) DEVICE — PAD GRND REM POLYHESIVE A/ DISP

## (undated) DEVICE — 450 ML BOTTLE OF 0.05% CHLORHEXIDINE GLUCONATE IN 99.95% STERILE WATER FOR IRRIGATION, USP AND APPLICATOR.: Brand: IRRISEPT ANTIMICROBIAL WOUND LAVAGE

## (undated) DEVICE — INTENDED FOR TISSUE SEPARATION, AND OTHER PROCEDURES THAT REQUIRE A SHARP SURGICAL BLADE TO PUNCTURE OR CUT.: Brand: BARD-PARKER ® CARBON RIB-BACK BLADES

## (undated) DEVICE — KT MANIFLD CARDIAC

## (undated) DEVICE — 90-S CRUISE, SUCTION PROBE, NON-BENDABLE, MAX CUT LEVEL 1: Brand: SERFAS ENERGY

## (undated) DEVICE — BUR BRL FORMLA 12FLUT 4MM

## (undated) DEVICE — SHOULDER ARTHROSCOPY-LF: Brand: MEDLINE INDUSTRIES, INC.

## (undated) DEVICE — STERILE POLYISOPRENE POWDER-FREE SURGICAL GLOVES: Brand: PROTEXIS

## (undated) DEVICE — KT INST FOR FIBERTAK ANCHR 2.6MM DISP

## (undated) DEVICE — SUT ETHLN 3-0 FS118IN 663H

## (undated) DEVICE — PK CATH CARD 40

## (undated) DEVICE — CANN TRPL DAM 7X7MM

## (undated) DEVICE — SUT MONOCRYL PLS ANTIB UND 3/0  PS1 27IN

## (undated) DEVICE — PENCL SMOKE/EVAC MEGADYNE TELESCP 10FT

## (undated) DEVICE — TR BAND RADIAL ARTERY COMPRESSION DEVICE: Brand: TR BAND

## (undated) DEVICE — APPL CHLORAPREP HI/LITE 26ML ORNG

## (undated) DEVICE — GAUZE,SPONGE,4"X4",16PLY,STRL,LF,10/TRAY: Brand: MEDLINE